# Patient Record
Sex: FEMALE | Race: BLACK OR AFRICAN AMERICAN | NOT HISPANIC OR LATINO | Employment: UNEMPLOYED | URBAN - METROPOLITAN AREA
[De-identification: names, ages, dates, MRNs, and addresses within clinical notes are randomized per-mention and may not be internally consistent; named-entity substitution may affect disease eponyms.]

---

## 2018-05-11 ENCOUNTER — HOSPITAL ENCOUNTER (EMERGENCY)
Facility: OTHER | Age: 28
Discharge: HOME | End: 2018-05-11
Attending: EMERGENCY MEDICINE
Payer: MEDICAID

## 2018-05-11 VITALS
BODY MASS INDEX: 32.66 KG/M2 | OXYGEN SATURATION: 97 % | HEIGHT: 62 IN | HEART RATE: 72 BPM | RESPIRATION RATE: 16 BRPM | DIASTOLIC BLOOD PRESSURE: 74 MMHG | WEIGHT: 177.5 LBS | SYSTOLIC BLOOD PRESSURE: 102 MMHG | TEMPERATURE: 98 F

## 2018-05-11 DIAGNOSIS — R10.33 PERIUMBILICAL PAIN: Primary | ICD-10-CM

## 2018-05-11 DIAGNOSIS — R11.0 NAUSEA IN ADULT: ICD-10-CM

## 2018-05-11 LAB
ALBUMIN SERPL BCP-MCNC: 3.9 G/DL
ALP SERPL-CCNC: 92 U/L
ALT SERPL W/O P-5'-P-CCNC: 37 U/L
ANION GAP SERPL CALC-SCNC: 15 MMOL/L
AST SERPL-CCNC: 35 U/L
B-HCG UR QL: NEGATIVE
BASOPHILS # BLD AUTO: 0.02 K/UL
BASOPHILS NFR BLD: 0.3 %
BILIRUB SERPL-MCNC: 0.3 MG/DL
BILIRUB UR QL STRIP: NEGATIVE
BUN SERPL-MCNC: 8 MG/DL
CALCIUM SERPL-MCNC: 9.1 MG/DL
CHLORIDE SERPL-SCNC: 105 MMOL/L
CLARITY UR: ABNORMAL
CO2 SERPL-SCNC: 22 MMOL/L
COLOR UR: YELLOW
CREAT SERPL-MCNC: 0.7 MG/DL
CTP QC/QA: YES
DIFFERENTIAL METHOD: NORMAL
EOSINOPHIL # BLD AUTO: 0 K/UL
EOSINOPHIL NFR BLD: 0.7 %
ERYTHROCYTE [DISTWIDTH] IN BLOOD BY AUTOMATED COUNT: 14 %
EST. GFR  (AFRICAN AMERICAN): >60 ML/MIN/1.73 M^2
EST. GFR  (NON AFRICAN AMERICAN): >60 ML/MIN/1.73 M^2
GLUCOSE SERPL-MCNC: 82 MG/DL
GLUCOSE UR QL STRIP: NEGATIVE
HCT VFR BLD AUTO: 37.5 %
HGB BLD-MCNC: 12 G/DL
HGB UR QL STRIP: NEGATIVE
KETONES UR QL STRIP: NEGATIVE
LEUKOCYTE ESTERASE UR QL STRIP: NEGATIVE
LIPASE SERPL-CCNC: 21 U/L
LYMPHOCYTES # BLD AUTO: 2 K/UL
LYMPHOCYTES NFR BLD: 33.9 %
MCH RBC QN AUTO: 28 PG
MCHC RBC AUTO-ENTMCNC: 32 G/DL
MCV RBC AUTO: 87 FL
MONOCYTES # BLD AUTO: 0.3 K/UL
MONOCYTES NFR BLD: 5.3 %
NEUTROPHILS # BLD AUTO: 3.5 K/UL
NEUTROPHILS NFR BLD: 59.6 %
NITRITE UR QL STRIP: NEGATIVE
PH UR STRIP: 6 [PH] (ref 5–8)
PLATELET # BLD AUTO: 238 K/UL
PMV BLD AUTO: 11.4 FL
POTASSIUM SERPL-SCNC: 3.4 MMOL/L
PROT SERPL-MCNC: 8.3 G/DL
PROT UR QL STRIP: NEGATIVE
RBC # BLD AUTO: 4.29 M/UL
SODIUM SERPL-SCNC: 142 MMOL/L
SP GR UR STRIP: >=1.03 (ref 1–1.03)
URN SPEC COLLECT METH UR: ABNORMAL
UROBILINOGEN UR STRIP-ACNC: NEGATIVE EU/DL
WBC # BLD AUTO: 5.87 K/UL

## 2018-05-11 PROCEDURE — 85025 COMPLETE CBC W/AUTO DIFF WBC: CPT

## 2018-05-11 PROCEDURE — 80053 COMPREHEN METABOLIC PANEL: CPT

## 2018-05-11 PROCEDURE — 81025 URINE PREGNANCY TEST: CPT | Performed by: EMERGENCY MEDICINE

## 2018-05-11 PROCEDURE — 83690 ASSAY OF LIPASE: CPT

## 2018-05-11 PROCEDURE — 96372 THER/PROPH/DIAG INJ SC/IM: CPT

## 2018-05-11 PROCEDURE — 25000003 PHARM REV CODE 250: Performed by: EMERGENCY MEDICINE

## 2018-05-11 PROCEDURE — 81003 URINALYSIS AUTO W/O SCOPE: CPT

## 2018-05-11 PROCEDURE — 63600175 PHARM REV CODE 636 W HCPCS: Performed by: EMERGENCY MEDICINE

## 2018-05-11 PROCEDURE — 99283 EMERGENCY DEPT VISIT LOW MDM: CPT

## 2018-05-11 RX ORDER — ONDANSETRON 8 MG/1
8 TABLET, ORALLY DISINTEGRATING ORAL EVERY 6 HOURS PRN
Qty: 10 TABLET | Refills: 0 | Status: SHIPPED | OUTPATIENT
Start: 2018-05-11 | End: 2018-09-30

## 2018-05-11 RX ORDER — ONDANSETRON 8 MG/1
8 TABLET, ORALLY DISINTEGRATING ORAL EVERY 6 HOURS PRN
Qty: 10 TABLET | Refills: 0 | Status: SHIPPED | OUTPATIENT
Start: 2018-05-11 | End: 2018-05-11

## 2018-05-11 RX ORDER — ONDANSETRON 8 MG/1
8 TABLET, ORALLY DISINTEGRATING ORAL
Status: COMPLETED | OUTPATIENT
Start: 2018-05-11 | End: 2018-05-11

## 2018-05-11 RX ORDER — KETOROLAC TROMETHAMINE 30 MG/ML
30 INJECTION, SOLUTION INTRAMUSCULAR; INTRAVENOUS
Status: COMPLETED | OUTPATIENT
Start: 2018-05-11 | End: 2018-05-11

## 2018-05-11 RX ADMIN — KETOROLAC TROMETHAMINE 30 MG: 30 INJECTION, SOLUTION INTRAMUSCULAR at 04:05

## 2018-05-11 RX ADMIN — ONDANSETRON 8 MG: 8 TABLET, ORALLY DISINTEGRATING ORAL at 04:05

## 2018-05-11 NOTE — ED NOTES
Patient sleeping in recliner, respirations even and unlabored, pt in no acute distress. Will continue to monitor.

## 2018-05-11 NOTE — ED TRIAGE NOTES
Patient presents to ED with c/o generalized abdominal pain and nausea x 2 days, also c/o fatigue and generalized weakness. Patient reports that she has a Hx of Crohn's. Patient states that she was kicked out of the [employer] this morning. Patient denies urinary symptoms. Patient ambulatory with steady gait. Pt AAO x4.

## 2018-05-11 NOTE — ED PROVIDER NOTES
Encounter Date: 5/11/2018    SCRIBE #1 NOTE: I, Marjan Chi, am scribing for, and in the presence of, Dr. Fermin.       History     Chief Complaint   Patient presents with    Abdominal Pain     mid abdominal pain x 2 days, Hx of Crohns, c/o nausea and fatigue      Time seen by provider: 4:00 AM    This is a 28 y.o. female who presents with complaint of abdominal pain that has intermittently occurred for two days. The pain is mainly located near the umbilicus. She notes it triggered by certain foods, lasts for approximately five minutes, and is consistent with a prior episode secondary to Chron's disease. Onset of nausea and constipation accompanied pain. The patient has only passed a small amount of stool with straining. She denies fever, chills, vomiting, abdominal distention, back pain, myalgias, or any urinary symptoms.         The history is provided by the patient.     Review of patient's allergies indicates:  No Known Allergies  Past Medical History:   Diagnosis Date    Crohn's disease      Past Surgical History:   Procedure Laterality Date    arm surgery      NO PAST SURGERIES       Family History   Problem Relation Age of Onset    COPD Neg Hx      Social History   Substance Use Topics    Smoking status: Never Smoker    Smokeless tobacco: Never Used    Alcohol use No     Review of Systems   Constitutional: Negative for activity change, appetite change, chills, diaphoresis and fever.   HENT: Negative for congestion, sore throat and trouble swallowing.    Eyes: Negative for photophobia and visual disturbance.   Respiratory: Negative for cough, chest tightness and shortness of breath.    Cardiovascular: Negative for chest pain.   Gastrointestinal: Positive for abdominal pain, constipation and nausea. Negative for abdominal distention and vomiting.   Endocrine: Negative for polydipsia and polyuria.   Genitourinary: Negative for decreased urine volume, difficulty urinating, dysuria, flank pain, frequency,  hematuria and urgency.   Musculoskeletal: Negative for back pain, myalgias and neck pain.   Skin: Negative for rash.   Neurological: Negative for weakness and headaches.   Psychiatric/Behavioral: Negative for confusion.       Physical Exam     Initial Vitals [05/11/18 0353]   BP Pulse Resp Temp SpO2   99/64 85 16 97.7 °F (36.5 °C) 96 %      MAP       75.67         Physical Exam    Nursing note and vitals reviewed.  Constitutional: She appears well-developed and well-nourished. She is not diaphoretic. No distress.   HENT:   Head: Normocephalic and atraumatic.   Eyes: EOM are normal. Pupils are equal, round, and reactive to light.   Neck: Normal range of motion.   Cardiovascular: Normal rate, regular rhythm and normal heart sounds. Exam reveals no gallop and no friction rub.    No murmur heard.  Pulmonary/Chest: Breath sounds normal. No respiratory distress. She has no wheezes. She has no rhonchi. She has no rales.   Abdominal: Soft. There is no tenderness. There is no rebound and no guarding.   Musculoskeletal: Normal range of motion. She exhibits no edema or tenderness.   Neurological: She is alert and oriented to person, place, and time.   Skin: Skin is warm and dry. No rash and no abscess noted. No erythema. No pallor.   Psychiatric: She has a normal mood and affect. Her behavior is normal. Judgment and thought content normal.         ED Course   Procedures  Labs Reviewed   URINALYSIS - Abnormal; Notable for the following:        Result Value    Appearance, UA Hazy (*)     Specific Gravity, UA >=1.030 (*)     All other components within normal limits   CBC W/ AUTO DIFFERENTIAL   COMPREHENSIVE METABOLIC PANEL   LIPASE   POCT URINE PREGNANCY             Medical Decision Making:   Clinical Tests:   Lab Tests: Ordered and Reviewed    Additional MDM:   Comments: 28-year-old female with history of Crohn's disease presents complaining of periumbilical abdominal pain as been intermittent for the past 2 days with  associated nausea and reported constipation even that she had a bowel movement yesterday.  Abdominal exam was benign.  Vital signs within normal limits.     Labs were obtained as well as urinalysis given her history although I suspect that this ED visit was likely secondary to her desire to find a place to stay.  Per the triage, the patient reported feeling kicked out of the [facility] yesterday and was asking if we need of a hospital where she could stay.  She received Zofran ODT and subsequently fell asleep.  His labs are within normal limits she will be discharged without further work-up to f/u in clinic as needed.  .          Scribe Attestation:   Scribe #1: I performed the above scribed service and the documentation accurately describes the services I performed. I attest to the accuracy of the note.    Attending Attestation:           Physician Attestation for Scribe:  Physician Attestation Statement for Scribe #1: I, Dr. Fermin, reviewed documentation, as scribed by Marjan Chi in my presence, and it is both accurate and complete.                    Clinical Impression:     1. Periumbilical pain    2. Nausea in adult                                 Bridgett Fermin MD  05/11/18 9076

## 2018-09-21 ENCOUNTER — HOSPITAL ENCOUNTER (EMERGENCY)
Facility: OTHER | Age: 28
Discharge: HOME | End: 2018-09-21
Attending: EMERGENCY MEDICINE
Payer: MEDICAID

## 2018-09-21 VITALS
HEART RATE: 86 BPM | DIASTOLIC BLOOD PRESSURE: 75 MMHG | BODY MASS INDEX: 35.4 KG/M2 | OXYGEN SATURATION: 95 % | HEIGHT: 60 IN | SYSTOLIC BLOOD PRESSURE: 129 MMHG | TEMPERATURE: 98 F | RESPIRATION RATE: 18 BRPM | WEIGHT: 180.31 LBS

## 2018-09-21 DIAGNOSIS — R07.89 CHEST PAIN, ATYPICAL: Primary | ICD-10-CM

## 2018-09-21 DIAGNOSIS — R45.89 DEPRESSED MOOD: ICD-10-CM

## 2018-09-21 DIAGNOSIS — Z00.8 MEDICAL CLEARANCE FOR PSYCHIATRIC ADMISSION: ICD-10-CM

## 2018-09-21 LAB
ALBUMIN SERPL BCP-MCNC: 3.5 G/DL
ALP SERPL-CCNC: 82 U/L
ALT SERPL W/O P-5'-P-CCNC: 16 U/L
AMPHET+METHAMPHET UR QL: NEGATIVE
ANION GAP SERPL CALC-SCNC: 11 MMOL/L
AST SERPL-CCNC: 15 U/L
B-HCG UR QL: NEGATIVE
BARBITURATES UR QL SCN>200 NG/ML: NEGATIVE
BASOPHILS # BLD AUTO: 0.02 K/UL
BASOPHILS NFR BLD: 0.3 %
BENZODIAZ UR QL SCN>200 NG/ML: NEGATIVE
BILIRUB SERPL-MCNC: 0.2 MG/DL
BILIRUB UR QL STRIP: NEGATIVE
BUN SERPL-MCNC: 9 MG/DL
BZE UR QL SCN: NEGATIVE
CALCIUM SERPL-MCNC: 9.5 MG/DL
CANNABINOIDS UR QL SCN: NEGATIVE
CHLORIDE SERPL-SCNC: 107 MMOL/L
CLARITY UR: CLEAR
CO2 SERPL-SCNC: 22 MMOL/L
COLOR UR: YELLOW
CREAT SERPL-MCNC: 0.7 MG/DL
CREAT UR-MCNC: 101 MG/DL
CTP QC/QA: YES
DIFFERENTIAL METHOD: ABNORMAL
EOSINOPHIL # BLD AUTO: 0.1 K/UL
EOSINOPHIL NFR BLD: 1.2 %
ERYTHROCYTE [DISTWIDTH] IN BLOOD BY AUTOMATED COUNT: 14.3 %
EST. GFR  (AFRICAN AMERICAN): >60 ML/MIN/1.73 M^2
EST. GFR  (NON AFRICAN AMERICAN): >60 ML/MIN/1.73 M^2
ETHANOL SERPL-MCNC: 19 MG/DL
GLUCOSE SERPL-MCNC: 101 MG/DL
GLUCOSE UR QL STRIP: NEGATIVE
HCT VFR BLD AUTO: 36.1 %
HGB BLD-MCNC: 11.6 G/DL
HGB UR QL STRIP: NEGATIVE
KETONES UR QL STRIP: NEGATIVE
LEUKOCYTE ESTERASE UR QL STRIP: NEGATIVE
LYMPHOCYTES # BLD AUTO: 2 K/UL
LYMPHOCYTES NFR BLD: 25.1 %
MCH RBC QN AUTO: 26.8 PG
MCHC RBC AUTO-ENTMCNC: 32.1 G/DL
MCV RBC AUTO: 83 FL
METHADONE UR QL SCN>300 NG/ML: NEGATIVE
MONOCYTES # BLD AUTO: 0.5 K/UL
MONOCYTES NFR BLD: 6.5 %
NEUTROPHILS # BLD AUTO: 5.2 K/UL
NEUTROPHILS NFR BLD: 66.8 %
NITRITE UR QL STRIP: NEGATIVE
OPIATES UR QL SCN: NEGATIVE
PCP UR QL SCN>25 NG/ML: NEGATIVE
PH UR STRIP: 7 [PH] (ref 5–8)
PLATELET # BLD AUTO: 264 K/UL
PMV BLD AUTO: 11.3 FL
POTASSIUM SERPL-SCNC: 3.9 MMOL/L
PROT SERPL-MCNC: 7.8 G/DL
PROT UR QL STRIP: NEGATIVE
RBC # BLD AUTO: 4.33 M/UL
SODIUM SERPL-SCNC: 140 MMOL/L
SP GR UR STRIP: 1.02 (ref 1–1.03)
TOXICOLOGY INFORMATION: NORMAL
TROPONIN I SERPL DL<=0.01 NG/ML-MCNC: <0.006 NG/ML
TSH SERPL DL<=0.005 MIU/L-ACNC: 3.23 UIU/ML
URN SPEC COLLECT METH UR: NORMAL
UROBILINOGEN UR STRIP-ACNC: NEGATIVE EU/DL
WBC # BLD AUTO: 7.8 K/UL

## 2018-09-21 PROCEDURE — 80053 COMPREHEN METABOLIC PANEL: CPT

## 2018-09-21 PROCEDURE — 80307 DRUG TEST PRSMV CHEM ANLYZR: CPT

## 2018-09-21 PROCEDURE — 99284 EMERGENCY DEPT VISIT MOD MDM: CPT | Mod: 25

## 2018-09-21 PROCEDURE — 85025 COMPLETE CBC W/AUTO DIFF WBC: CPT

## 2018-09-21 PROCEDURE — 93010 ELECTROCARDIOGRAM REPORT: CPT | Mod: ,,, | Performed by: INTERNAL MEDICINE

## 2018-09-21 PROCEDURE — 80320 DRUG SCREEN QUANTALCOHOLS: CPT

## 2018-09-21 PROCEDURE — 84443 ASSAY THYROID STIM HORMONE: CPT

## 2018-09-21 PROCEDURE — 84484 ASSAY OF TROPONIN QUANT: CPT

## 2018-09-21 PROCEDURE — 81025 URINE PREGNANCY TEST: CPT | Performed by: EMERGENCY MEDICINE

## 2018-09-21 PROCEDURE — 81003 URINALYSIS AUTO W/O SCOPE: CPT | Mod: 59

## 2018-09-21 PROCEDURE — 93005 ELECTROCARDIOGRAM TRACING: CPT

## 2018-09-21 NOTE — ED PROVIDER NOTES
"Encounter Date: 2018    SCRIBE #1 NOTE: I, Klaudia Johnson, am scribing for, and in the presence of, Dr. Fermin.       History     Chief Complaint   Patient presents with    Suicidal     want to OD because she needs some rest, sister  a few days ago of AIDS and mom  in August, does not have access to pills     Time seen by provider: 12:14 AM    This is a 28 y.o. female who presents with multiple complaints. Patient reports constant mid sternal chest pain that began at 9:00 AM. The "shocking" pain is non radiating. She reports experiencing similar chest pain with Crohn's episodes in the past. She reports intermittent SOB that is not associated with the episodes of chest pain. She also reports abdominal pain that resolved after a bowel movement. She reports PMHx of HTN.    Patient also reports suicidal ideation. She reports feeling depressed for the past month. She reports six or seven past attempts to overdose and last attempt was "a couple" months ago. She denies homicidal ideation. Upon further questioning, she reports she only came here today because she does not have anywhere to sleep and states "I just need to rest." She reports she was staying with a friend, but is now unable to because the friend stays out too late. She does not have access to any pills. She now reports she feels comfortable to leave and that she will not attempt self harm. She has not been seen by psychiatry recently. She is non compliant with Seroquel and Remeron.      The history is provided by the patient.     Review of patient's allergies indicates:  No Known Allergies  Past Medical History:   Diagnosis Date    Crohn's disease     Depression     Hypertension      Past Surgical History:   Procedure Laterality Date    arm surgery      NO PAST SURGERIES       Family History   Problem Relation Age of Onset    COPD Neg Hx      Social History     Tobacco Use    Smoking status: Never Smoker    Smokeless tobacco: Never Used "   Substance Use Topics    Alcohol use: No    Drug use: No     Review of Systems   Constitutional: Negative for fever.   HENT: Negative for congestion, rhinorrhea and sore throat.    Respiratory: Positive for shortness of breath. Negative for cough.    Cardiovascular: Positive for chest pain.   Gastrointestinal: Positive for abdominal pain. Negative for abdominal distention, blood in stool, constipation, diarrhea, nausea and vomiting.   Genitourinary: Negative for dysuria.   Musculoskeletal: Negative for back pain.   Skin: Negative for rash.   Neurological: Negative for weakness and headaches.   Hematological: Does not bruise/bleed easily.   Psychiatric/Behavioral: Positive for dysphoric mood and suicidal ideas. Negative for hallucinations and self-injury.        Negative for homicidal ideation.       Physical Exam     Initial Vitals [09/21/18 0011]   BP Pulse Resp Temp SpO2   (!) 153/93 84 16 98.1 °F (36.7 °C) (!) 94 %      MAP       --         Physical Exam    Nursing note and vitals reviewed.  Constitutional: She appears well-developed and well-nourished. She is not diaphoretic. No distress.   HENT:   Head: Normocephalic and atraumatic.   Eyes: EOM are normal. No scleral icterus.   Neck: Normal range of motion. Neck supple.   Cardiovascular: Normal rate, regular rhythm and normal heart sounds. Exam reveals no gallop and no friction rub.    No murmur heard.  Pulmonary/Chest: Breath sounds normal. No respiratory distress. She has no wheezes. She has no rhonchi. She has no rales.   Abdominal: Soft. Bowel sounds are normal. She exhibits no distension. There is no tenderness. There is no rebound and no guarding.   Musculoskeletal: Normal range of motion. She exhibits no edema or tenderness.   Neurological: She is alert and oriented to person, place, and time.   Skin: Skin is warm and dry. No rash noted. No pallor.         ED Course   Procedures  Labs Reviewed   CBC W/ AUTO DIFFERENTIAL - Abnormal; Notable for the  following components:       Result Value    Hemoglobin 11.6 (*)     Hematocrit 36.1 (*)     MCH 26.8 (*)     All other components within normal limits   COMPREHENSIVE METABOLIC PANEL - Abnormal; Notable for the following components:    CO2 22 (*)     All other components within normal limits   ALCOHOL,MEDICAL (ETHANOL) - Abnormal; Notable for the following components:    Alcohol, Medical, Serum 19 (*)     All other components within normal limits   URINALYSIS, REFLEX TO URINE CULTURE    Narrative:     Preferred Collection Type->Urine, Clean Catch   DRUG SCREEN PANEL, URINE EMERGENCY    Narrative:     Preferred Collection Type->Urine, Clean Catch   TSH   TROPONIN I   POCT URINE PREGNANCY     EKG Readings: (Independently Interpreted)   Sinus rhythm at rate of 83 bpm. Normal intervals. T wave inversion in lead III. No ST changes.       Imaging Results    None             Additional MDM:   Comments: 28-year-old female with no documented psychiatric history in our system presents complaining of suicidal ideation as well as chest pain. She reported her chest pain was nonradiating and has been present throughout the day.  She also reported feeling that she would overdose on medicines but did not have access to any.  Her story was very inconsistent, therefore, I did explain to her the process if she were to be PEC'd.  At that time the patient retracted her statement that she had any suicidal ideation.  She clarified that she had been outd because of this she could not return to the shelter.  She stated she just needed a place to sleep.  EKG, troponin, and medical clearance labs were obtained.  EKG showed no evidence any acute ischemic changes.  Troponin was negative. Clearance labs were unremarkable. The patient has now been in the emergency department for almost 2 hr.  On reassessment I asked her again if she had any suicide ideation and she denies.  Patient reassured me that she felt comfortable being discharged.  At this  time she does not meet criteria for a PEC.  She will be discharged in stable condition..          Scribe Attestation:   Scribe #1: I performed the above scribed service and the documentation accurately describes the services I performed. I attest to the accuracy of the note.    Attending Attestation:           Physician Attestation for Scribe:  Physician Attestation Statement for Scribe #1: I, Dr. Fermin, reviewed documentation, as scribed by Klaudia Johnson in my presence, and it is both accurate and complete.                    Clinical Impression:     1. Medical clearance for psychiatric admission                                 Bridgett Fermin MD  09/21/18 0139

## 2018-09-21 NOTE — ED NOTES
Pt resting comfortably, NAD noted. Pt denies CP or SI at At this time. Side rails upx2, call bell within reach. Will continue to monitor

## 2018-09-21 NOTE — ED NOTES
"Upon d/c pt states that she " will follow up with MD to get back on medication". Pt does not mention SI upon discharge  "

## 2018-09-21 NOTE — ED TRIAGE NOTES
"Pt presents to ED with c/o mid sternal chest "throbbing" chest pain that started around 9am yesterday. Pt denies hx of chest pain, sob, abdominal pain, n/v, dizziness, weakness. Pt also states that she has been "feeling suicidal" since earlier today because she " is tired " pt states her plan was to "overdose" on pills. .  After MD at bedside explained process of PEC, pt's tone changed and stated " she would not kill herself if she went home " pt states " I am just tried and could not get into the [employer] tonight because I stayed out late with a friend". After discussing plan with pt, pt denied multiple times that she is not suicidal and that she just "wants to get some rest". Pt admits that she has a hx of depression and htn but has not been taking her remeron for over a month.  Pt denies HI, hallucination s    "

## 2018-09-30 ENCOUNTER — HOSPITAL ENCOUNTER (EMERGENCY)
Facility: OTHER | Age: 28
Discharge: HOME | End: 2018-09-30
Attending: EMERGENCY MEDICINE
Payer: MEDICAID

## 2018-09-30 VITALS
HEIGHT: 59 IN | TEMPERATURE: 98 F | SYSTOLIC BLOOD PRESSURE: 136 MMHG | BODY MASS INDEX: 30.24 KG/M2 | OXYGEN SATURATION: 99 % | WEIGHT: 150 LBS | HEART RATE: 93 BPM | DIASTOLIC BLOOD PRESSURE: 75 MMHG

## 2018-09-30 DIAGNOSIS — Z87.19 HISTORY OF BLOODY STOOLS: ICD-10-CM

## 2018-09-30 DIAGNOSIS — R10.84 GENERALIZED ABDOMINAL PAIN: Primary | ICD-10-CM

## 2018-09-30 LAB
ALBUMIN SERPL BCP-MCNC: 3.6 G/DL
ALP SERPL-CCNC: 91 U/L
ALT SERPL W/O P-5'-P-CCNC: 20 U/L
ANION GAP SERPL CALC-SCNC: 11 MMOL/L
AST SERPL-CCNC: 19 U/L
B-HCG UR QL: NEGATIVE
BACTERIA #/AREA URNS HPF: ABNORMAL /HPF
BASOPHILS # BLD AUTO: 0.01 K/UL
BASOPHILS NFR BLD: 0.2 %
BILIRUB SERPL-MCNC: 0.2 MG/DL
BILIRUB UR QL STRIP: NEGATIVE
BUN SERPL-MCNC: 6 MG/DL
CALCIUM SERPL-MCNC: 9 MG/DL
CHLORIDE SERPL-SCNC: 106 MMOL/L
CLARITY UR: CLEAR
CO2 SERPL-SCNC: 22 MMOL/L
COLOR UR: YELLOW
CREAT SERPL-MCNC: 0.7 MG/DL
CTP QC/QA: YES
DIFFERENTIAL METHOD: ABNORMAL
EOSINOPHIL # BLD AUTO: 0.1 K/UL
EOSINOPHIL NFR BLD: 1.4 %
ERYTHROCYTE [DISTWIDTH] IN BLOOD BY AUTOMATED COUNT: 15 %
EST. GFR  (AFRICAN AMERICAN): >60 ML/MIN/1.73 M^2
EST. GFR  (NON AFRICAN AMERICAN): >60 ML/MIN/1.73 M^2
GLUCOSE SERPL-MCNC: 107 MG/DL
GLUCOSE UR QL STRIP: NEGATIVE
HCT VFR BLD AUTO: 36.2 %
HGB BLD-MCNC: 11.5 G/DL
HGB UR QL STRIP: ABNORMAL
KETONES UR QL STRIP: NEGATIVE
LEUKOCYTE ESTERASE UR QL STRIP: NEGATIVE
LIPASE SERPL-CCNC: 22 U/L
LYMPHOCYTES # BLD AUTO: 1.8 K/UL
LYMPHOCYTES NFR BLD: 27.9 %
MCH RBC QN AUTO: 26.9 PG
MCHC RBC AUTO-ENTMCNC: 31.8 G/DL
MCV RBC AUTO: 85 FL
MICROSCOPIC COMMENT: ABNORMAL
MONOCYTES # BLD AUTO: 0.6 K/UL
MONOCYTES NFR BLD: 8.8 %
NEUTROPHILS # BLD AUTO: 3.9 K/UL
NEUTROPHILS NFR BLD: 61.5 %
NITRITE UR QL STRIP: NEGATIVE
PH UR STRIP: 6 [PH] (ref 5–8)
PLATELET # BLD AUTO: 262 K/UL
PMV BLD AUTO: 10.9 FL
POTASSIUM SERPL-SCNC: 3.9 MMOL/L
PROT SERPL-MCNC: 7.9 G/DL
PROT UR QL STRIP: NEGATIVE
RBC # BLD AUTO: 4.27 M/UL
RBC #/AREA URNS HPF: 5 /HPF (ref 0–4)
SODIUM SERPL-SCNC: 139 MMOL/L
SP GR UR STRIP: >=1.03 (ref 1–1.03)
SQUAMOUS #/AREA URNS HPF: 4 /HPF
URN SPEC COLLECT METH UR: ABNORMAL
UROBILINOGEN UR STRIP-ACNC: NEGATIVE EU/DL
WBC # BLD AUTO: 6.37 K/UL
WBC #/AREA URNS HPF: 0 /HPF (ref 0–5)
WBC CLUMPS URNS QL MICRO: ABNORMAL
YEAST URNS QL MICRO: ABNORMAL

## 2018-09-30 PROCEDURE — 81000 URINALYSIS NONAUTO W/SCOPE: CPT

## 2018-09-30 PROCEDURE — 85025 COMPLETE CBC W/AUTO DIFF WBC: CPT

## 2018-09-30 PROCEDURE — 81025 URINE PREGNANCY TEST: CPT | Performed by: EMERGENCY MEDICINE

## 2018-09-30 PROCEDURE — 96374 THER/PROPH/DIAG INJ IV PUSH: CPT

## 2018-09-30 PROCEDURE — 83690 ASSAY OF LIPASE: CPT

## 2018-09-30 PROCEDURE — 25000003 PHARM REV CODE 250: Performed by: PHYSICIAN ASSISTANT

## 2018-09-30 PROCEDURE — 80053 COMPREHEN METABOLIC PANEL: CPT

## 2018-09-30 PROCEDURE — 99283 EMERGENCY DEPT VISIT LOW MDM: CPT | Mod: 25

## 2018-09-30 PROCEDURE — 63600175 PHARM REV CODE 636 W HCPCS: Performed by: PHYSICIAN ASSISTANT

## 2018-09-30 PROCEDURE — 96361 HYDRATE IV INFUSION ADD-ON: CPT

## 2018-09-30 RX ORDER — HYOSCYAMINE SULFATE 0.5 MG/ML
0.5 INJECTION, SOLUTION SUBCUTANEOUS
Status: COMPLETED | OUTPATIENT
Start: 2018-09-30 | End: 2018-09-30

## 2018-09-30 RX ADMIN — HYOSCYAMINE SULFATE 0.5 MG: 0.5 INJECTION, SOLUTION SUBCUTANEOUS at 09:09

## 2018-09-30 RX ADMIN — SODIUM CHLORIDE 1000 ML: 0.9 INJECTION, SOLUTION INTRAVENOUS at 09:09

## 2018-10-01 NOTE — ED PROVIDER NOTES
Encounter Date: 9/30/2018       History     Chief Complaint   Patient presents with    Rectal Bleeding     onset 2 days ago, abdominal pain, hx chrons, been off chrons medication     Patient is a 28 y.o. female with a past medical history of hypertension, depression, Crohn's disease, presenting with complaints of rectal bleeding.  Patient states that her lower abdominal pain started 2 days ago and has been constant since.  She reports she has had multiple episodes of rectal bleeding with her loose stools.  She admits that she has a longstanding history of Crohn's disease but is not on medication for at least 2 years.  She states she thinks she needs to be on some now but does not have a GI provider here.  She denies any fever or chills. She states she had have a blood transfusion which she is very on but has not had 1 for many years.This is the extent of the patient's complaints at this time.         The history is provided by the patient.     Review of patient's allergies indicates:  No Known Allergies  Past Medical History:   Diagnosis Date    Crohn's disease     Depression     Hypertension      Past Surgical History:   Procedure Laterality Date    arm surgery      NO PAST SURGERIES       Family History   Problem Relation Age of Onset    COPD Neg Hx      Social History     Tobacco Use    Smoking status: Current Every Day Smoker    Smokeless tobacco: Never Used   Substance Use Topics    Alcohol use: No    Drug use: No     Review of Systems   Constitutional: Negative for activity change, appetite change, chills, fatigue and fever.   HENT: Negative for congestion, rhinorrhea and sore throat.    Eyes: Negative for photophobia and visual disturbance.   Respiratory: Negative for cough, shortness of breath and wheezing.    Cardiovascular: Negative for chest pain.   Gastrointestinal: Positive for abdominal pain and blood in stool. Negative for diarrhea, nausea and vomiting.   Genitourinary: Negative for  dysuria, hematuria and urgency.   Musculoskeletal: Negative for back pain, myalgias and neck pain.   Skin: Negative for color change and wound.   Neurological: Negative for weakness and headaches.   Psychiatric/Behavioral: Negative for agitation and confusion.       Physical Exam     Initial Vitals [09/30/18 2126]   BP Pulse Resp Temp SpO2   (!) 164/108 (!) 112 -- 97.7 °F (36.5 °C) 98 %      MAP       --         Physical Exam    Nursing note and vitals reviewed.  Constitutional: She appears well-developed and well-nourished. She is not diaphoretic.  Non-toxic appearance. She does not have a sickly appearance. No distress.   Well-appearing, obese,  female accompanied the emergency department.  Speaking clearly in full sentences.  No acute distress.   HENT:   Head: Normocephalic and atraumatic.   Right Ear: External ear normal.   Left Ear: External ear normal.   Nose: Nose normal.   Mouth/Throat: Oropharynx is clear and moist.   Eyes: Conjunctivae and EOM are normal.   Neck: Normal range of motion. Neck supple.   Cardiovascular: Regular rhythm and normal heart sounds. Tachycardia present.    Pulmonary/Chest: Breath sounds normal. No respiratory distress. She has no wheezes.   Abdominal: Soft. Bowel sounds are normal. She exhibits no distension. There is no tenderness. There is no rebound and no guarding.   Genitourinary: Rectal exam shows external hemorrhoid. Rectal exam shows no internal hemorrhoid, anal tone normal and guaiac negative stool. Guaiac negative stool.   Musculoskeletal: Normal range of motion.   Neurological: She is alert and oriented to person, place, and time.   Skin: Skin is warm.   Psychiatric: She has a normal mood and affect. Her behavior is normal. Judgment and thought content normal.         ED Course   Procedures  Labs Reviewed   CBC W/ AUTO DIFFERENTIAL - Abnormal; Notable for the following components:       Result Value    Hemoglobin 11.5 (*)     Hematocrit 36.2 (*)     MCH  26.9 (*)     MCHC 31.8 (*)     RDW 15.0 (*)     All other components within normal limits   COMPREHENSIVE METABOLIC PANEL - Abnormal; Notable for the following components:    CO2 22 (*)     All other components within normal limits   URINALYSIS, REFLEX TO URINE CULTURE - Abnormal; Notable for the following components:    Specific Gravity, UA >=1.030 (*)     Occult Blood UA 2+ (*)     All other components within normal limits    Narrative:     Preferred Collection Type->Urine, Clean Catch   URINALYSIS MICROSCOPIC - Abnormal; Notable for the following components:    RBC, UA 5 (*)     All other components within normal limits    Narrative:     Preferred Collection Type->Urine, Clean Catch   LIPASE   POCT URINE PREGNANCY           Medical Decision Making:   Initial Assessment:   Urgent evaluation a 28-year-old female with a past medical history of hypertension, depression, Crohn's, presenting to the emergency department with complaints of abdominal pain and rectal bleeding x2 days.  Patient is afebrile, nontoxic appearing, hemodynamically stable.  Physical exam reveals mild tenderness to palpation of the suprapubic abdomen with no rebound, guarding, mass. Tachycardia noted. Will plan for labs, fluids and reassess.  Clinical Tests:   Lab Tests: Ordered and Reviewed  The following lab test(s) were unremarkable: CBC, CMP, Urinalysis and UPT  ED Management:  UPT is negative.  UA is unremarkable.  CBC shows no leukocytosis, H&H 11.5/36.2, consistent with baseline.  CMP is unremarkable. Lipase is normal. On reassessment, patient reports improvement of her symptoms.  She denies any further pain.  Guaiac-negative stool.  Patient opted on medication in over 2 years, do not feel this appropriate to start medication for Crohn's disease in the emergency department tonight.  I explained to the patient that she needed to obtain follow up with PCP that could refer her to GI.  She is given resources for all of this.  Repeat vitals  showed normalization of heart rate at 93 beats per minute. At this time, no further testing imaging is warranted.  Patient is discharged home in stable condition.The patient was instructed to follow up with a primary care provider in 2 days or to return to the emergency department for worsening symptoms. The treatment plan was discussed with the patient who demonstrated understanding and comfort with plan. The patient's history, physical exam, and plan of care was discussed with and agreed upon with my supervising physician.     This note was created using [employer] Fluency Direct. There may be typographical errors secondary to dictation.                         Clinical Impression:     1. Generalized abdominal pain    2. History of bloody stools           Disposition:   Disposition: Discharged  Condition: Stable                        Juliet Huertas PA-C  09/30/18 2328       Juliet Huertas PA-C  09/30/18 2320

## 2018-10-01 NOTE — ED NOTES
Pt with Hx of crohn's disease and not taking medications for at least 2 years states that she has had generalized abd pain and rectal bleeding with diarrhea X 2 days. Pt states that she has to get a PCP and a GI dr so that she can get back on her medications. Pt has no idea what those are other than prednisone but does not know dosage

## 2018-10-01 NOTE — ED NOTES
EDUCATION: Discussed with pt need to control her HTN; states she used to be on medications; also discussed need for her to establish a PCM to treat her for her HTN and her Crohns disease; Discussed with pt risk of stroke if HTN not managed; states her mother  at age 56 from a stroke;

## 2018-10-01 NOTE — ED TRIAGE NOTES
"Pt states history of Crohns disease, states "I think I'm eating too much hot suace" ; "I've had rectal bleeding and loose stools for two days"  "

## 2018-10-02 ENCOUNTER — HOSPITAL ENCOUNTER (EMERGENCY)
Facility: OTHER | Age: 28
Discharge: PSYCHIATRIC OR SUBSTANCE USE FACILITY | End: 2018-10-03
Attending: EMERGENCY MEDICINE
Payer: MEDICAID

## 2018-10-02 DIAGNOSIS — R45.851 SUICIDAL IDEATIONS: Primary | ICD-10-CM

## 2018-10-02 PROCEDURE — 80320 DRUG SCREEN QUANTALCOHOLS: CPT

## 2018-10-02 PROCEDURE — 81000 URINALYSIS NONAUTO W/SCOPE: CPT | Mod: 59

## 2018-10-02 PROCEDURE — 85025 COMPLETE CBC W/AUTO DIFF WBC: CPT

## 2018-10-02 PROCEDURE — 80329 ANALGESICS NON-OPIOID 1 OR 2: CPT

## 2018-10-02 PROCEDURE — 81025 URINE PREGNANCY TEST: CPT | Performed by: EMERGENCY MEDICINE

## 2018-10-02 PROCEDURE — 99285 EMERGENCY DEPT VISIT HI MDM: CPT

## 2018-10-02 PROCEDURE — 80307 DRUG TEST PRSMV CHEM ANLYZR: CPT

## 2018-10-02 PROCEDURE — 80048 BASIC METABOLIC PNL TOTAL CA: CPT

## 2018-10-03 VITALS
HEART RATE: 88 BPM | OXYGEN SATURATION: 96 % | BODY MASS INDEX: 35.44 KG/M2 | WEIGHT: 200 LBS | TEMPERATURE: 98 F | RESPIRATION RATE: 18 BRPM | SYSTOLIC BLOOD PRESSURE: 143 MMHG | HEIGHT: 63 IN | DIASTOLIC BLOOD PRESSURE: 87 MMHG

## 2018-10-03 LAB
AMPHET+METHAMPHET UR QL: NEGATIVE
ANION GAP SERPL CALC-SCNC: 14 MMOL/L
APAP SERPL-MCNC: <3 UG/ML
B-HCG UR QL: NEGATIVE
BACTERIA #/AREA URNS HPF: ABNORMAL /HPF
BARBITURATES UR QL SCN>200 NG/ML: NEGATIVE
BASOPHILS # BLD AUTO: 0.01 K/UL
BASOPHILS NFR BLD: 0.2 %
BENZODIAZ UR QL SCN>200 NG/ML: NEGATIVE
BILIRUB UR QL STRIP: NEGATIVE
BUN SERPL-MCNC: 14 MG/DL
BZE UR QL SCN: NORMAL
CALCIUM SERPL-MCNC: 9.8 MG/DL
CANNABINOIDS UR QL SCN: NEGATIVE
CHLORIDE SERPL-SCNC: 107 MMOL/L
CLARITY UR: CLEAR
CO2 SERPL-SCNC: 22 MMOL/L
COLOR UR: YELLOW
CREAT SERPL-MCNC: 0.9 MG/DL
CREAT UR-MCNC: 204.9 MG/DL
CTP QC/QA: YES
DIFFERENTIAL METHOD: ABNORMAL
EOSINOPHIL # BLD AUTO: 0.1 K/UL
EOSINOPHIL NFR BLD: 0.8 %
ERYTHROCYTE [DISTWIDTH] IN BLOOD BY AUTOMATED COUNT: 15.4 %
EST. GFR  (AFRICAN AMERICAN): >60 ML/MIN/1.73 M^2
EST. GFR  (NON AFRICAN AMERICAN): >60 ML/MIN/1.73 M^2
ETHANOL SERPL-MCNC: 106 MG/DL
ETHANOL SERPL-MCNC: 181 MG/DL
ETHANOL SERPL-MCNC: 79 MG/DL
GLUCOSE SERPL-MCNC: 110 MG/DL
GLUCOSE UR QL STRIP: NEGATIVE
HCT VFR BLD AUTO: 37.9 %
HGB BLD-MCNC: 12.1 G/DL
HGB UR QL STRIP: ABNORMAL
KETONES UR QL STRIP: ABNORMAL
LEUKOCYTE ESTERASE UR QL STRIP: NEGATIVE
LYMPHOCYTES # BLD AUTO: 2.1 K/UL
LYMPHOCYTES NFR BLD: 32.2 %
MCH RBC QN AUTO: 27.1 PG
MCHC RBC AUTO-ENTMCNC: 31.9 G/DL
MCV RBC AUTO: 85 FL
METHADONE UR QL SCN>300 NG/ML: NEGATIVE
MICROSCOPIC COMMENT: ABNORMAL
MONOCYTES # BLD AUTO: 0.4 K/UL
MONOCYTES NFR BLD: 6.3 %
NEUTROPHILS # BLD AUTO: 3.9 K/UL
NEUTROPHILS NFR BLD: 60.3 %
NITRITE UR QL STRIP: NEGATIVE
OPIATES UR QL SCN: NORMAL
PCP UR QL SCN>25 NG/ML: NEGATIVE
PH UR STRIP: 6 [PH] (ref 5–8)
PLATELET # BLD AUTO: 306 K/UL
PMV BLD AUTO: 11 FL
POTASSIUM SERPL-SCNC: 3.6 MMOL/L
PROT UR QL STRIP: ABNORMAL
RBC # BLD AUTO: 4.46 M/UL
RBC #/AREA URNS HPF: 10 /HPF (ref 0–4)
SALICYLATES SERPL-MCNC: <5 MG/DL
SODIUM SERPL-SCNC: 143 MMOL/L
SP GR UR STRIP: >=1.03 (ref 1–1.03)
SQUAMOUS #/AREA URNS HPF: 4 /HPF
TOXICOLOGY INFORMATION: NORMAL
URN SPEC COLLECT METH UR: ABNORMAL
UROBILINOGEN UR STRIP-ACNC: NEGATIVE EU/DL
WBC # BLD AUTO: 6.37 K/UL
WBC #/AREA URNS HPF: 0 /HPF (ref 0–5)
WBC CLUMPS URNS QL MICRO: ABNORMAL
YEAST URNS QL MICRO: ABNORMAL

## 2018-10-03 PROCEDURE — 80320 DRUG SCREEN QUANTALCOHOLS: CPT | Mod: 91

## 2018-10-03 RX ORDER — DIPHENHYDRAMINE HYDROCHLORIDE 50 MG/ML
50 INJECTION INTRAMUSCULAR; INTRAVENOUS EVERY 4 HOURS PRN
Status: DISCONTINUED | OUTPATIENT
Start: 2018-10-03 | End: 2018-10-03 | Stop reason: HOSPADM

## 2018-10-03 RX ORDER — LORAZEPAM 1 MG/1
2 TABLET ORAL EVERY 4 HOURS PRN
Status: DISCONTINUED | OUTPATIENT
Start: 2018-10-03 | End: 2018-10-03 | Stop reason: HOSPADM

## 2018-10-03 RX ORDER — HALOPERIDOL 5 MG/ML
5 INJECTION INTRAMUSCULAR EVERY 4 HOURS PRN
Status: DISCONTINUED | OUTPATIENT
Start: 2018-10-03 | End: 2018-10-03 | Stop reason: HOSPADM

## 2018-10-03 NOTE — ED NOTES
Admit packet faxed to the following facilities: [facility], [facility], [facility], and [facility].

## 2018-10-03 NOTE — ED PROVIDER NOTES
"Encounter Date: 10/2/2018    SCRIBE #1 NOTE: I, Shivani Guajardo, am scribing for, and in the presence of, Dr. Grider.       History     Chief Complaint   Patient presents with    detox     "Im dopesick, and need to go somewhere, ill kick everything in the hospital if yall dont."     Time seen by provider: 11:19 PM    This is a 28 y.o. female with hx of HTN, alcohol abuse, cocaine abuse, and depression who presents due to SI today. She has wanted to kill herself for the past 2 days. She reports that she would stab herself  "because I didn't have any money to go to the store and buy any pills." She has hx of multiple overdoses in the past with suicide attempts. Pt states "I'm tired" and "I'm miserable." She reports that her sister  3 weeks ago due to AIDS and pt states "I didn't go to see my sister and it's fucking with me." She also reports death of mother years ago and recent death of nephew a month ago. She reports flashbacks and fatigue. She has been admitted to a psychiatric facility in the past. She reports that she has not followed up with any psychiatrists. She denies any recent drinking. Pt reports no HI or AH/VH. She reports no fever, chills, chest pain, SOB, dizziness, and HA.      The history is provided by the patient.     Review of patient's allergies indicates:  No Known Allergies  Past Medical History:   Diagnosis Date    Crohn's disease     Depression     Hypertension      Past Surgical History:   Procedure Laterality Date    arm surgery      NO PAST SURGERIES       Family History   Problem Relation Age of Onset    COPD Neg Hx      Social History     Tobacco Use    Smoking status: Current Every Day Smoker    Smokeless tobacco: Never Used   Substance Use Topics    Alcohol use: No    Drug use: No     Review of Systems   Constitutional: Positive for fatigue. Negative for chills and fever.   HENT: Negative for congestion, rhinorrhea and sore throat.    Respiratory: Negative for cough " and shortness of breath.    Cardiovascular: Negative for chest pain.   Gastrointestinal: Negative for abdominal pain, diarrhea, nausea and vomiting.   Endocrine: Negative for polyuria.   Genitourinary: Negative for decreased urine volume and dysuria.   Musculoskeletal: Negative for back pain.   Skin: Negative for rash.   Allergic/Immunologic: Negative for immunocompromised state.   Neurological: Negative for dizziness, weakness, light-headedness and headaches.   Hematological: Does not bruise/bleed easily.   Psychiatric/Behavioral: Positive for suicidal ideas. Negative for confusion and hallucinations.        Negative for HI.       Physical Exam     Initial Vitals [10/02/18 2306]   BP Pulse Resp Temp SpO2   126/60 102 16 97.7 °F (36.5 °C) 97 %      MAP       --         Physical Exam    Nursing note and vitals reviewed.  Constitutional: She appears well-developed and well-nourished. No distress.   Appears intoxicated.    HENT:   Head: Normocephalic and atraumatic.   Right Ear: External ear normal.   Left Ear: External ear normal.   Eyes: Right eye exhibits no discharge. Left eye exhibits no discharge.   Neck: Normal range of motion. Neck supple.   Cardiovascular: Normal rate, regular rhythm and normal heart sounds.   Pulmonary/Chest: Breath sounds normal. No respiratory distress.   Abdominal: Soft. Bowel sounds are normal. She exhibits no distension. There is no tenderness.   Musculoskeletal: Normal range of motion.   Lymphadenopathy:     She has no cervical adenopathy.   Neurological: She is alert and oriented to person, place, and time. She has normal strength. No cranial nerve deficit or sensory deficit.   Skin: Skin is warm and dry. No rash noted. No erythema.   Psychiatric: Her behavior is normal.   Pt is suicidal. States she is going to stab herself. Normal mood. Expanded affect.         ED Course   Procedures  Labs Reviewed   CBC W/ AUTO DIFFERENTIAL - Abnormal; Notable for the following components:        Result Value    MCHC 31.9 (*)     RDW 15.4 (*)     All other components within normal limits   BASIC METABOLIC PANEL - Abnormal; Notable for the following components:    CO2 22 (*)     All other components within normal limits   URINALYSIS - Abnormal; Notable for the following components:    Specific Gravity, UA >=1.030 (*)     Protein, UA Trace (*)     Ketones, UA Trace (*)     Occult Blood UA 3+ (*)     All other components within normal limits   ACETAMINOPHEN LEVEL - Abnormal; Notable for the following components:    Acetaminophen (Tylenol), Serum <3.0 (*)     All other components within normal limits   SALICYLATE LEVEL - Abnormal; Notable for the following components:    Salicylate Lvl <5.0 (*)     All other components within normal limits   ALCOHOL,MEDICAL (ETHANOL) - Abnormal; Notable for the following components:    Alcohol, Medical, Serum 181 (*)     All other components within normal limits   URINALYSIS MICROSCOPIC - Abnormal; Notable for the following components:    RBC, UA 10 (*)     All other components within normal limits   DRUG SCREEN PANEL, URINE EMERGENCY   ALCOHOL,MEDICAL (ETHANOL)   POCT URINE PREGNANCY             Medical Decision Making:   History:   Old Records Summarized: other records.       <> Summary of Records:   Pt was admitted to [facility] in 2017 for OD and attempted suicide. She has had 4 overdoses in the past. Intentional tylenol overdose in February. Hx of cocaine intoxication with perceptual disturbance. Hx of alcohol and cocaine abuse.  Clinical Tests:   Lab Tests: Ordered and Reviewed  ED Management:  28-year-old female presents with suicidal ideations.  Review medical records she has a history of schizophrenia as well as alcohol and cocaine abuse.  Reviewing the medical records does able to access [facility] records.  Last time the patient had presented with suicidal ideations was in February of this year which she was PEC'd and kept inpatient for 2-3 days.  His I  was unable to find anything the chart suggesting a malingering like behavior.  Will get the labs but not immediately placed on a PEC.  I suspect she is intoxicated and will re-evaluate when she is less intoxicated.    2:30 a.m. on re-evaluation patient still states she wants to kill herself and will use some the else's knife to stab herself.  Based on this as well as the consistency of her story throughout the evening, I will place the patient on a PEC for further psychiatric evaluation.    This is the extent to the patients complaints today here in the emergency department.            Scribe Attestation:   Scribe #1: I performed the above scribed service and the documentation accurately describes the services I performed. I attest to the accuracy of the note.    Attending Attestation:           Physician Attestation for Scribe:  Physician Attestation Statement for Scribe #1: I, Dr. Grider, reviewed documentation, as scribed by Shivani Guajardo in my presence, and it is both accurate and complete.                    Clinical Impression:     1. Suicidal ideations                                 Evaristo Grider, DO  10/03/18 0414

## 2018-10-03 NOTE — ED NOTES
Pt resting quietly. walked to bathroom with escort. breakfast order. Pt informed of transfer and breakfast ordered. Kiki roque at bedside.

## 2018-10-03 NOTE — ED NOTES
Patient accepted at [facility] by Dr. Mary Stevens. Report to be called to [phone].

## 2018-10-03 NOTE — ED NOTES
Faxed packet to [facility], [facility], [facility], [facility], [facility], [facility], [facility], [facility], [facility], [facility], [facility], [facility],. [facility], and [facility],  [facility].

## 2018-10-03 NOTE — ED NOTES
"Pt is AAOx4 with ABC's intact, C/C of detox x 3 days, pt stated " I got the med sickness because I ain't been taking my stuff because my mom , I need help my brain ain't right I want to kill myself ". Pt denies CP,SOB, N/V/D, or any pain. equal CBBS, ABD soft/ non tender, PMSx4 to all extremities, negative JVD/ edema. patient is aggressive and loud and has poor train of thought, denies HI, no social hx/ family hx.  General: No fever, chills, fatigability, or weight loss.  Skin: No rashes, itching, or changes in color or texture of skin.  Chest: Denies PARK, cyanosis, wheezing, cough, and sputum production.  Abdomen: Appetite fine. No weight loss. abdominal pain, hematemesis, or blood in stool.  Musculoskeletal: No joint stiffness or swelling. Denies back pain.  All other review of systems negative.  Pt punched rails on bed. Pt continually cursed staff during assessment.  "

## 2018-10-03 NOTE — ED NOTES
Pt care assumed   Pt resting in bed comfortably at this time with respirations even and unlabored, bathroom needs assessed and pain level remains 0/10. PEC in place and one to one direct observation maintained with Aliza MENDOZA at this time.  Pt is awaiting medical clearance. RN will remain available for pt's needs.

## 2018-11-18 ENCOUNTER — HOSPITAL ENCOUNTER (EMERGENCY)
Facility: OTHER | Age: 28
Discharge: HOME | End: 2018-11-18
Attending: EMERGENCY MEDICINE
Payer: MEDICAID

## 2018-11-18 VITALS
BODY MASS INDEX: 32.57 KG/M2 | SYSTOLIC BLOOD PRESSURE: 141 MMHG | RESPIRATION RATE: 16 BRPM | WEIGHT: 183.88 LBS | HEART RATE: 87 BPM | DIASTOLIC BLOOD PRESSURE: 97 MMHG | TEMPERATURE: 98 F | OXYGEN SATURATION: 100 %

## 2018-11-18 DIAGNOSIS — T69.022A TRENCH FOOT OF LEFT LOWER EXTREMITY, INITIAL ENCOUNTER: Primary | ICD-10-CM

## 2018-11-18 DIAGNOSIS — T69.021A TRENCH FOOT OF RIGHT LOWER EXTREMITY, INITIAL ENCOUNTER: ICD-10-CM

## 2018-11-18 LAB
B-HCG UR QL: NEGATIVE
CTP QC/QA: YES

## 2018-11-18 PROCEDURE — 25000003 PHARM REV CODE 250: Performed by: PHYSICIAN ASSISTANT

## 2018-11-18 PROCEDURE — 99284 EMERGENCY DEPT VISIT MOD MDM: CPT

## 2018-11-18 PROCEDURE — 81025 URINE PREGNANCY TEST: CPT | Performed by: EMERGENCY MEDICINE

## 2018-11-18 RX ORDER — CLOTRIMAZOLE 1 %
CREAM (GRAM) TOPICAL
Status: COMPLETED | OUTPATIENT
Start: 2018-11-18 | End: 2018-11-18

## 2018-11-18 RX ORDER — MUPIROCIN 20 MG/G
1 OINTMENT TOPICAL
Status: COMPLETED | OUTPATIENT
Start: 2018-11-18 | End: 2018-11-18

## 2018-11-18 RX ORDER — CLOTRIMAZOLE 1 %
CREAM (GRAM) TOPICAL
Qty: 15 G | Refills: 0 | Status: ON HOLD | OUTPATIENT
Start: 2018-11-18 | End: 2019-03-21 | Stop reason: HOSPADM

## 2018-11-18 RX ORDER — MUPIROCIN 20 MG/G
OINTMENT TOPICAL 3 TIMES DAILY
Qty: 22 G | Refills: 0 | Status: SHIPPED | OUTPATIENT
Start: 2018-11-18 | End: 2018-11-28

## 2018-11-18 RX ADMIN — MUPIROCIN 22 G: 20 OINTMENT TOPICAL at 06:11

## 2018-11-18 RX ADMIN — CLOTRIMAZOLE: 10 CREAM TOPICAL at 06:11

## 2018-11-18 NOTE — ED PROVIDER NOTES
Encounter Date: 11/18/2018       History     Chief Complaint   Patient presents with    Foot Pain     bilateral foot pain after pedicure. reports redness and open areas     Patient is 28 year old female with history of Crohn's, depression, hypertension who presents with complaints of foot pain and peeling skin to both feel. She reports that she had a pedicure about 3 weeks ago and she reports that she noticed about 3 days ago some skin peeling on her feet. She reports that she has been pulling off dead skin and admits that in doing this, she anciently creates cracks in her feet that start bleeding. She reports no associated fever, chills, nausea, vomiting, chest pain or SOB. She is currently unaccompanied int  ER.           Review of patient's allergies indicates:  No Known Allergies  Past Medical History:   Diagnosis Date    Crohn's disease     Depression     Hypertension      Past Surgical History:   Procedure Laterality Date    arm surgery      NO PAST SURGERIES       Family History   Problem Relation Age of Onset    COPD Neg Hx      Social History     Tobacco Use    Smoking status: Current Every Day Smoker    Smokeless tobacco: Never Used   Substance Use Topics    Alcohol use: No    Drug use: No     Review of Systems   Constitutional: Negative for fever.   HENT: Negative for sore throat.    Respiratory: Negative for shortness of breath.    Cardiovascular: Negative for chest pain.   Gastrointestinal: Negative for nausea.   Genitourinary: Negative for dysuria.   Musculoskeletal: Negative for back pain.   Skin: Negative for rash.        Sloughing skin to bilateral feet with skin cracks that are bleeding    Neurological: Negative for weakness.   Hematological: Does not bruise/bleed easily.       Physical Exam     Initial Vitals [11/18/18 1632]   BP Pulse Resp Temp SpO2   (!) 173/126 92 18 98.1 °F (36.7 °C) 98 %      MAP       --         Physical Exam    Nursing note and vitals reviewed.  Constitutional:  She appears well-developed and well-nourished. She is not diaphoretic. No distress.   Healthy appearing female in NAD or apparent pain. She makes good eye contact, speaks in clear full sentences and ambulates with ease.    HENT:   Head: Normocephalic and atraumatic.   Eyes: Conjunctivae and EOM are normal. Pupils are equal, round, and reactive to light. Right eye exhibits no discharge. Left eye exhibits no discharge. No scleral icterus.   Neck: Normal range of motion.   Cardiovascular: Normal rate, regular rhythm and normal heart sounds. Exam reveals no gallop and no friction rub.    No murmur heard.  Pulmonary/Chest: Breath sounds normal. She has no wheezes. She has no rhonchi. She has no rales.   Abdominal: Soft. Bowel sounds are normal. There is no tenderness. There is no rebound and no guarding.   Musculoskeletal: Normal range of motion. She exhibits no edema or tenderness.   Lymphadenopathy:     She has no cervical adenopathy.   Neurological: She is alert and oriented to person, place, and time. She has normal strength.   Skin: Skin is warm. Capillary refill takes less than 2 seconds. No rash and no abscess noted. No erythema.   See attached photo that was obtained with patient's verbal consent.  There is skin sloughing to bilateral feet with subsequent cracking and bleeding. There is some evidence of interdigital web sapcing fungal infection. There is no evidence of acute secondary bacterial infection.    Psychiatric: She has a normal mood and affect. Her behavior is normal. Thought content normal.         ED Course   Procedures  Labs Reviewed   POCT URINE PREGNANCY                   Imaging Results    None          Medical Decision Making:   ED Management:  Urgent evaluation of 28 year old female who presents with complaints most consistent with trench foot. She is afebrile, non-toxic appearing and hemodynamically stable thought hypertensive at 173/126. Physical exam outlined above and reveals skin sloughing  with cracks and bleeding. There is normal DP and PT pulses. No concern for acute osseous process.  No imaging felt warranted.  Will cover with Lotrimin as well as Bactroban and encourage follow-up with Dermatology.  Patient educated on ED return precautions and verbalizes understanding.  She is felt safe for discharge. Case discussed extensively with attending physician who also evaluated the patient and agrees with plan.  Other:   I have discussed this case with another health care provider.       <> Summary of the Discussion: Mini                      Clinical Impression:   The primary encounter diagnosis was Trench foot of left lower extremity, initial encounter. A diagnosis of Trench foot of right lower extremity, initial encounter was also pertinent to this visit.                             Karen Valenzuela PA-C  11/18/18 2000

## 2018-11-18 NOTE — ED TRIAGE NOTES
"Pt arrived to ED with c/o bilateral foot pain x3 days. Pt states " I think its because of my dry skin. I have been peeling it and I am getting cuts on my feet" both feel look severely dry and cracked with odor.   "

## 2019-02-13 ENCOUNTER — HOSPITAL ENCOUNTER (EMERGENCY)
Facility: OTHER | Age: 29
Discharge: LEFT BEFORE DISCHARGE | End: 2019-02-13
Payer: MEDICAID

## 2019-02-13 VITALS
HEIGHT: 59 IN | WEIGHT: 180 LBS | TEMPERATURE: 98 F | RESPIRATION RATE: 18 BRPM | BODY MASS INDEX: 36.29 KG/M2 | HEART RATE: 92 BPM | SYSTOLIC BLOOD PRESSURE: 148 MMHG | DIASTOLIC BLOOD PRESSURE: 91 MMHG | OXYGEN SATURATION: 99 %

## 2019-02-13 LAB
B-HCG UR QL: NEGATIVE
BILIRUB UR QL STRIP: NEGATIVE
CLARITY UR: CLEAR
COLOR UR: YELLOW
CTP QC/QA: YES
GLUCOSE UR QL STRIP: NEGATIVE
HGB UR QL STRIP: NEGATIVE
KETONES UR QL STRIP: NEGATIVE
LEUKOCYTE ESTERASE UR QL STRIP: NEGATIVE
NITRITE UR QL STRIP: NEGATIVE
PH UR STRIP: 6 [PH] (ref 5–8)
PROT UR QL STRIP: NEGATIVE
SP GR UR STRIP: 1.02 (ref 1–1.03)
URN SPEC COLLECT METH UR: NORMAL
UROBILINOGEN UR STRIP-ACNC: NEGATIVE EU/DL

## 2019-02-13 PROCEDURE — 81003 URINALYSIS AUTO W/O SCOPE: CPT

## 2019-02-13 PROCEDURE — 81025 URINE PREGNANCY TEST: CPT | Performed by: EMERGENCY MEDICINE

## 2019-02-13 PROCEDURE — 99900041 HC LEFT WITHOUT BEING SEEN- EMERGENCY

## 2019-02-13 NOTE — ED NOTES
"Pt in lobby, asking nurse "how much longer, I need to take my drug test"   RN stated the approximated number of people ahead of her, but that this number could change at any time, and pt verbalized understanding  "

## 2019-02-15 ENCOUNTER — HOSPITAL ENCOUNTER (EMERGENCY)
Facility: OTHER | Age: 29
Discharge: HOME | End: 2019-02-15
Attending: EMERGENCY MEDICINE
Payer: MEDICAID

## 2019-02-15 VITALS
RESPIRATION RATE: 18 BRPM | TEMPERATURE: 98 F | OXYGEN SATURATION: 98 % | SYSTOLIC BLOOD PRESSURE: 140 MMHG | BODY MASS INDEX: 38.29 KG/M2 | WEIGHT: 189.63 LBS | DIASTOLIC BLOOD PRESSURE: 90 MMHG | HEART RATE: 80 BPM

## 2019-02-15 DIAGNOSIS — R11.0 NAUSEA: Primary | ICD-10-CM

## 2019-02-15 LAB
B-HCG UR QL: NEGATIVE
CTP QC/QA: YES

## 2019-02-15 PROCEDURE — 25000003 PHARM REV CODE 250: Performed by: PHYSICIAN ASSISTANT

## 2019-02-15 PROCEDURE — 81025 URINE PREGNANCY TEST: CPT | Performed by: PHYSICIAN ASSISTANT

## 2019-02-15 PROCEDURE — 99283 EMERGENCY DEPT VISIT LOW MDM: CPT

## 2019-02-15 RX ORDER — ONDANSETRON 4 MG/1
4 TABLET, ORALLY DISINTEGRATING ORAL
Status: COMPLETED | OUTPATIENT
Start: 2019-02-15 | End: 2019-02-15

## 2019-02-15 RX ADMIN — ONDANSETRON 4 MG: 4 TABLET, ORALLY DISINTEGRATING ORAL at 11:02

## 2019-02-15 NOTE — ED TRIAGE NOTES
Pt reports nausea for the past three days. Pt denies vomiting or any other s/s. Pt states she is three days late for her cycle. She denies taking a home upt. Pt denies having a PCP

## 2019-02-22 ENCOUNTER — HOSPITAL ENCOUNTER (EMERGENCY)
Facility: OTHER | Age: 29
Discharge: HOME | End: 2019-02-22
Attending: EMERGENCY MEDICINE
Payer: MEDICAID

## 2019-02-22 VITALS
BODY MASS INDEX: 35.44 KG/M2 | HEIGHT: 63 IN | SYSTOLIC BLOOD PRESSURE: 98 MMHG | DIASTOLIC BLOOD PRESSURE: 54 MMHG | WEIGHT: 200 LBS | RESPIRATION RATE: 18 BRPM | TEMPERATURE: 98 F | HEART RATE: 96 BPM | OXYGEN SATURATION: 97 %

## 2019-02-22 DIAGNOSIS — R14.0 ABDOMINAL BLOATING: Primary | ICD-10-CM

## 2019-02-22 LAB
ALBUMIN SERPL BCP-MCNC: 3.6 G/DL
ALP SERPL-CCNC: 90 U/L
ALT SERPL W/O P-5'-P-CCNC: 41 U/L
ANION GAP SERPL CALC-SCNC: 13 MMOL/L
AST SERPL-CCNC: 30 U/L
BASOPHILS # BLD AUTO: 0.02 K/UL
BASOPHILS NFR BLD: 0.3 %
BILIRUB SERPL-MCNC: 0.2 MG/DL
BUN SERPL-MCNC: 12 MG/DL
CALCIUM SERPL-MCNC: 9 MG/DL
CHLORIDE SERPL-SCNC: 109 MMOL/L
CO2 SERPL-SCNC: 22 MMOL/L
CREAT SERPL-MCNC: 0.8 MG/DL
DIFFERENTIAL METHOD: ABNORMAL
EOSINOPHIL # BLD AUTO: 0 K/UL
EOSINOPHIL NFR BLD: 0.5 %
ERYTHROCYTE [DISTWIDTH] IN BLOOD BY AUTOMATED COUNT: 15.1 %
EST. GFR  (AFRICAN AMERICAN): >60 ML/MIN/1.73 M^2
EST. GFR  (NON AFRICAN AMERICAN): >60 ML/MIN/1.73 M^2
ETHANOL SERPL-MCNC: 151 MG/DL
GLUCOSE SERPL-MCNC: 112 MG/DL
HCT VFR BLD AUTO: 37.8 %
HGB BLD-MCNC: 12.1 G/DL
LIPASE SERPL-CCNC: 31 U/L
LYMPHOCYTES # BLD AUTO: 2.1 K/UL
LYMPHOCYTES NFR BLD: 32 %
MCH RBC QN AUTO: 27.3 PG
MCHC RBC AUTO-ENTMCNC: 32 G/DL
MCV RBC AUTO: 85 FL
MONOCYTES # BLD AUTO: 0.7 K/UL
MONOCYTES NFR BLD: 10.3 %
NEUTROPHILS # BLD AUTO: 3.7 K/UL
NEUTROPHILS NFR BLD: 56.6 %
PLATELET # BLD AUTO: 300 K/UL
PMV BLD AUTO: 11.1 FL
POCT GLUCOSE: 107 MG/DL (ref 70–110)
POTASSIUM SERPL-SCNC: 3.7 MMOL/L
PROT SERPL-MCNC: 7.9 G/DL
RBC # BLD AUTO: 4.43 M/UL
SODIUM SERPL-SCNC: 144 MMOL/L
WBC # BLD AUTO: 6.5 K/UL

## 2019-02-22 PROCEDURE — 96360 HYDRATION IV INFUSION INIT: CPT

## 2019-02-22 PROCEDURE — 96361 HYDRATE IV INFUSION ADD-ON: CPT

## 2019-02-22 PROCEDURE — 80053 COMPREHEN METABOLIC PANEL: CPT

## 2019-02-22 PROCEDURE — 80320 DRUG SCREEN QUANTALCOHOLS: CPT

## 2019-02-22 PROCEDURE — 99284 EMERGENCY DEPT VISIT MOD MDM: CPT | Mod: 25

## 2019-02-22 PROCEDURE — 85025 COMPLETE CBC W/AUTO DIFF WBC: CPT

## 2019-02-22 PROCEDURE — 25000003 PHARM REV CODE 250: Performed by: EMERGENCY MEDICINE

## 2019-02-22 PROCEDURE — 82962 GLUCOSE BLOOD TEST: CPT

## 2019-02-22 PROCEDURE — 83690 ASSAY OF LIPASE: CPT

## 2019-02-22 RX ORDER — FAMOTIDINE 20 MG/1
20 TABLET, FILM COATED ORAL
Status: COMPLETED | OUTPATIENT
Start: 2019-02-22 | End: 2019-02-22

## 2019-02-22 RX ORDER — MESALAMINE 800 MG/1
800 TABLET, DELAYED RELEASE ORAL 3 TIMES DAILY
Qty: 90 TABLET | Refills: 0 | Status: ON HOLD | OUTPATIENT
Start: 2019-02-22 | End: 2019-03-21 | Stop reason: HOSPADM

## 2019-02-22 RX ORDER — FAMOTIDINE 20 MG/1
20 TABLET, FILM COATED ORAL 2 TIMES DAILY PRN
Qty: 30 TABLET | Refills: 0 | Status: ON HOLD | OUTPATIENT
Start: 2019-02-22 | End: 2019-03-21 | Stop reason: HOSPADM

## 2019-02-22 RX ADMIN — SODIUM CHLORIDE 1000 ML: 0.9 INJECTION, SOLUTION INTRAVENOUS at 04:02

## 2019-02-22 RX ADMIN — FAMOTIDINE 20 MG: 20 TABLET ORAL at 04:02

## 2019-02-22 NOTE — ED PROVIDER NOTES
Encounter Date: 2/22/2019    SCRIBE #1 NOTE: I, Tigre Trinh, am scribing for, and in the presence of, Dr. Murillo.       History     Chief Complaint   Patient presents with    Abdominal Pain     onset a few days ago. reports PMH chrons, doesnt take any meds for it. denies NVD. denies blood in stool      Seen by provider: 4:24 AM    Patient is a 28 y.o. female with a history of crohn's disease who presents to the ED with complaint of abdominal bloating for two days. Patient reports mild associated abdominal pain as well as one episode of blood tinged diarrhea two days ago. She has not had any further bloody stools. Her last bowel movement was yesterday. She denies fever, chills, nausea, vomiting, or urinary symptoms. She is not currently on medications for her crohn's disease, but notes she has been on prednisone in the past. She admits to eating a poor diet with frequent spicy foods. She admits to use of alcohol tonight. She denies daily use of alcohol. She denies use of illicit drugs. She has no additional complaints at this time.      The history is provided by the patient.     Review of patient's allergies indicates:  No Known Allergies  Past Medical History:   Diagnosis Date    Crohn's disease     Depression     Hypertension      Past Surgical History:   Procedure Laterality Date    arm surgery      NO PAST SURGERIES       Family History   Problem Relation Age of Onset    COPD Neg Hx      Social History     Tobacco Use    Smoking status: Current Every Day Smoker    Smokeless tobacco: Never Used   Substance Use Topics    Alcohol use: No    Drug use: No     Review of Systems   Constitutional: Negative for fever.   HENT: Negative for sore throat.    Eyes: Negative for redness.   Respiratory: Negative for shortness of breath.    Cardiovascular: Negative for chest pain.   Gastrointestinal: Positive for abdominal distention, abdominal pain, blood in stool (resolved) and diarrhea (resolved). Negative  for nausea and vomiting.   Genitourinary: Negative for dysuria.   Musculoskeletal: Negative for back pain.   Skin: Negative for rash.   Neurological: Negative for weakness.   Psychiatric/Behavioral: Negative for confusion.       Physical Exam     Initial Vitals   BP Pulse Resp Temp SpO2   02/22/19 0233 02/22/19 0124 02/22/19 0124 02/22/19 0124 02/22/19 0124   107/87 100 16 98.1 °F (36.7 °C) (!) 93 %      MAP       --                Physical Exam    Nursing note and vitals reviewed.  Constitutional: She appears well-developed and well-nourished. She is not diaphoretic. No distress.   Mildly intoxicated.    HENT:   Head: Normocephalic and atraumatic.   Dry mucous membranes.   Eyes: Conjunctivae and EOM are normal. Pupils are equal, round, and reactive to light.   Neck: Normal range of motion. Neck supple.   Cardiovascular: Normal rate, regular rhythm, normal heart sounds and normal pulses.   No murmur heard.  Pulmonary/Chest: Breath sounds normal. No respiratory distress. She has no wheezes. She has no rhonchi. She has no rales.   Abdominal: Soft. There is no tenderness. There is no rebound and no guarding.   Neurological: She is alert and oriented to person, place, and time. She has normal strength. No cranial nerve deficit.   Skin: Skin is warm and dry.   Psychiatric: She has a normal mood and affect. Her behavior is normal. Thought content normal.         ED Course   Procedures  Labs Reviewed   CBC W/ AUTO DIFFERENTIAL - Abnormal; Notable for the following components:       Result Value    RDW 15.1 (*)     All other components within normal limits    Narrative:     For upper or mid abdominal pain.   COMPREHENSIVE METABOLIC PANEL - Abnormal; Notable for the following components:    CO2 22 (*)     Glucose 112 (*)     All other components within normal limits    Narrative:     For upper or mid abdominal pain.   ALCOHOL,MEDICAL (ETHANOL) - Abnormal; Notable for the following components:    Alcohol, Medical, Serum 151  (*)     All other components within normal limits   LIPASE    Narrative:     For upper or mid abdominal pain.   POCT GLUCOSE          Imaging Results    None          Medical Decision Making:   Initial Assessment:       29-year-old female with reported history of Crohn's disease presents with abdominal bloating for 2 days, and 1 episode of blood in stool 2 days ago but none since then.  She is no longer on any meds for Crohn's disease for the past 2 years, due to improved symptoms and lack of follow-up; she has had a few ED visits for this but no significant findings of IBD exacerbation found.  On arrival patient is intoxicated, admits to drinking alcohol PTA, with no abdominal tenderness or distention or ascites.  No active bloody stools, fevers, signs of abscess or fistula, or other concerning findings.     Basic labs checked with normal WBC, no anemia, normal LFT/lipase, and ETOH level 151.  No indication for emergent CT abdomen or imaging at this time since no sign of significant Crohn's flare or complications.  Patient admits to diet heavy and spicy and greasy foods, and occasional social heavy EtOH use, which could cause mild gastritis and subsequent sensation of bloating.  No sign active GI bleed, liver disease, or hemorrhagic gastritis.      Patient was observed in the ED and on reassessment is ambulatory at baseline and able to have coherent discussion about treatment plan. Patient is requesting medications for Crohn's since she states she will not be able to see her GI for another few months.  Will Rx Pepcid b.i.d. to cover component of gastritis as well as mesalamine; she will call her GI tomorrow to see if she should start taking this.  Patient advised to modify diet and decreased alcohol use to avoid these symptoms, and return to the ED for any abdominal pain or any other concerns.      Clinical Tests:   Lab Tests: Ordered and Reviewed            Scribe Attestation:   Scribe #1: I performed the above  scribed service and the documentation accurately describes the services I performed. I attest to the accuracy of the note.    Attending Attestation:           Physician Attestation for Scribe:  Physician Attestation Statement for Scribe #1: I, Dr. Murillo, reviewed documentation, as scribed by Tigre Trinh in my presence, and it is both accurate and complete.                    Clinical Impression:     1. Abdominal bloating                                  Rodrick Murillo MD  02/25/19 104

## 2019-02-22 NOTE — ED NOTES
"Pt presented to ED via POV with complaints of subjective "abd pain". On arrival pt appears calm with slurred speech and unsteady gait, etoh noted on breath, per EMS pt was found at bottom of ramp and proceeded to help pt enter ED. Pt currently sleeping in bed RR easy non labored, NAD. AAOx4, VSS, arousal able to voice. Continuous blood pressure, pulse ox and cardiac monitoring in place, side rails up x2, call light within reach, bed in lowest locked position, will continue to monitor and assess for changes.   "

## 2019-02-22 NOTE — ED NOTES
Pt ambulates down zimmerman with steady gait, negative slurred speech or nystagmus noted. Will prepare for discharge

## 2019-02-22 NOTE — ED NOTES
Pt was at the bottom of the ramp and assisted up in a wheelchair by a paramedic that was dropping off another pt. Patient proceeded to curse at the paramedic that was helping her when he asked her questions about why she was here to see if he could assist her.

## 2019-02-22 NOTE — ED NOTES
Pt instructed on need of urine specimen as soon as possible, verbalizes understanding and will provide

## 2019-03-14 ENCOUNTER — HOSPITAL ENCOUNTER (EMERGENCY)
Facility: OTHER | Age: 29
Discharge: PSYCHIATRIC OR SUBSTANCE USE FACILITY | End: 2019-03-14
Attending: EMERGENCY MEDICINE
Payer: MEDICAID

## 2019-03-14 VITALS
HEIGHT: 61 IN | WEIGHT: 184.75 LBS | BODY MASS INDEX: 34.88 KG/M2 | TEMPERATURE: 98 F | OXYGEN SATURATION: 98 % | SYSTOLIC BLOOD PRESSURE: 145 MMHG | DIASTOLIC BLOOD PRESSURE: 92 MMHG | RESPIRATION RATE: 16 BRPM | HEART RATE: 74 BPM

## 2019-03-14 DIAGNOSIS — R45.851 SUICIDAL IDEATIONS: Primary | ICD-10-CM

## 2019-03-14 PROBLEM — F32.A DEPRESSION WITH SUICIDAL IDEATION: Status: ACTIVE | Noted: 2019-03-14

## 2019-03-14 LAB
AMPHET+METHAMPHET UR QL: NEGATIVE
ANION GAP SERPL CALC-SCNC: 11 MMOL/L
APAP SERPL-MCNC: <3 UG/ML
B-HCG UR QL: NEGATIVE
BACTERIA #/AREA URNS HPF: ABNORMAL /HPF
BARBITURATES UR QL SCN>200 NG/ML: NEGATIVE
BASOPHILS # BLD AUTO: 0.01 K/UL
BASOPHILS NFR BLD: 0.2 %
BENZODIAZ UR QL SCN>200 NG/ML: NEGATIVE
BILIRUB UR QL STRIP: ABNORMAL
BUN SERPL-MCNC: 11 MG/DL
BZE UR QL SCN: ABNORMAL
CALCIUM SERPL-MCNC: 9.3 MG/DL
CANNABINOIDS UR QL SCN: NEGATIVE
CHLORIDE SERPL-SCNC: 108 MMOL/L
CLARITY UR: ABNORMAL
CO2 SERPL-SCNC: 22 MMOL/L
COLOR UR: YELLOW
CREAT SERPL-MCNC: 0.8 MG/DL
CREAT UR-MCNC: 412.3 MG/DL
CTP QC/QA: YES
DIFFERENTIAL METHOD: ABNORMAL
EOSINOPHIL # BLD AUTO: 0.1 K/UL
EOSINOPHIL NFR BLD: 1 %
ERYTHROCYTE [DISTWIDTH] IN BLOOD BY AUTOMATED COUNT: 14.9 %
EST. GFR  (AFRICAN AMERICAN): >60 ML/MIN/1.73 M^2
EST. GFR  (NON AFRICAN AMERICAN): >60 ML/MIN/1.73 M^2
ETHANOL SERPL-MCNC: <10 MG/DL
GLUCOSE SERPL-MCNC: 108 MG/DL
GLUCOSE UR QL STRIP: NEGATIVE
HCT VFR BLD AUTO: 37.9 %
HGB BLD-MCNC: 12.1 G/DL
HGB UR QL STRIP: NEGATIVE
HYALINE CASTS #/AREA URNS LPF: 4 /LPF
KETONES UR QL STRIP: ABNORMAL
LEUKOCYTE ESTERASE UR QL STRIP: NEGATIVE
LYMPHOCYTES # BLD AUTO: 1.1 K/UL
LYMPHOCYTES NFR BLD: 22.9 %
MCH RBC QN AUTO: 27.7 PG
MCHC RBC AUTO-ENTMCNC: 31.9 G/DL
MCV RBC AUTO: 87 FL
METHADONE UR QL SCN>300 NG/ML: NEGATIVE
MICROSCOPIC COMMENT: ABNORMAL
MONOCYTES # BLD AUTO: 0.7 K/UL
MONOCYTES NFR BLD: 13.8 %
NEUTROPHILS # BLD AUTO: 3.1 K/UL
NEUTROPHILS NFR BLD: 61.9 %
NITRITE UR QL STRIP: NEGATIVE
OPIATES UR QL SCN: NEGATIVE
PCP UR QL SCN>25 NG/ML: NEGATIVE
PH UR STRIP: 7 [PH] (ref 5–8)
PLATELET # BLD AUTO: 229 K/UL
PMV BLD AUTO: 11.2 FL
POTASSIUM SERPL-SCNC: 3.8 MMOL/L
PROT UR QL STRIP: ABNORMAL
RBC # BLD AUTO: 4.37 M/UL
RBC #/AREA URNS HPF: 1 /HPF (ref 0–4)
SODIUM SERPL-SCNC: 141 MMOL/L
SP GR UR STRIP: 1.02 (ref 1–1.03)
TOXICOLOGY INFORMATION: ABNORMAL
TSH SERPL DL<=0.005 MIU/L-ACNC: 0.83 UIU/ML
URN SPEC COLLECT METH UR: ABNORMAL
UROBILINOGEN UR STRIP-ACNC: 1 EU/DL
WBC # BLD AUTO: 4.93 K/UL
WBC #/AREA URNS HPF: 12 /HPF (ref 0–5)

## 2019-03-14 PROCEDURE — 80329 ANALGESICS NON-OPIOID 1 OR 2: CPT

## 2019-03-14 PROCEDURE — 80320 DRUG SCREEN QUANTALCOHOLS: CPT

## 2019-03-14 PROCEDURE — 81000 URINALYSIS NONAUTO W/SCOPE: CPT

## 2019-03-14 PROCEDURE — 99282 EMERGENCY DEPT VISIT SF MDM: CPT | Mod: GT,,, | Performed by: PSYCHIATRY & NEUROLOGY

## 2019-03-14 PROCEDURE — 80307 DRUG TEST PRSMV CHEM ANLYZR: CPT

## 2019-03-14 PROCEDURE — 80048 BASIC METABOLIC PNL TOTAL CA: CPT

## 2019-03-14 PROCEDURE — 99282 PR EMERGENCY DEPT VISIT,LEVEL II: ICD-10-PCS | Mod: GT,,, | Performed by: PSYCHIATRY & NEUROLOGY

## 2019-03-14 PROCEDURE — 85025 COMPLETE CBC W/AUTO DIFF WBC: CPT

## 2019-03-14 PROCEDURE — 87086 URINE CULTURE/COLONY COUNT: CPT

## 2019-03-14 PROCEDURE — 81025 URINE PREGNANCY TEST: CPT | Performed by: EMERGENCY MEDICINE

## 2019-03-14 PROCEDURE — 84443 ASSAY THYROID STIM HORMONE: CPT

## 2019-03-14 PROCEDURE — 99285 EMERGENCY DEPT VISIT HI MDM: CPT

## 2019-03-14 NOTE — ED NOTES
Security called to ED for inventory of pt belongings. Pt belongings placed into green bags with pt labels per PEC policy.

## 2019-03-14 NOTE — ED NOTES
Gisselle AWAN aware of ordered telemedicine psych consult, awaiting Psych MD Lujan to call for consult.

## 2019-03-14 NOTE — CONSULTS
"Tele-Consultation to Emergency Department from Psychiatry    Please see previous notes:    Patient agreeable to consultation via telepsychiatry.    Consultation started: 3/14/2019 at  5;05pm   The chief complaint leading to psychiatric consultation is: SI  This consultation was requested by Evaristo Grider DO, the Emergency Department attending physician.  The location of the consulting psychiatrist is [address] [place] [address].  The patient location is [facility].  The patient arrived at the ED at: 2;07pm    Also present with the patient at the time of the consultation: edson    Patient Identification:  Patient information was obtained from patient and past medical records.  Patient presented voluntarily to the Emergency Department ambulatory.    History of Present Illness:  José Manuel Vines is a 29 y.o. Female. With past psychiatric h/o severe alcohol use d/o, caocine use d/o and MDD presents voluntarily to the ED c/o Suicidal ideations x 3 days with plan today of OD.   She reports that over the past months her mood has been down most of the days but denied any hopelessness. She also endorses + changes in sleep, irritability. She denied any HI/AVH or psychotic symptoms. Pt reports drinking up to 3 bottles of "vodka gin" a day since she got out of [facility] in psych facility earlier this year. She also admits to using "coke" 2 times a day for the past month. Pt kept asking if we were done with the interview and mostly provided edith answers without much details. Pts affect is withdrawn blunted guarded and non reactive.   She currently continues to report having SI with plan to OD and unable to provide any collateral info or a safety plan.     Psychiatric History:   Hospitalization: Yes, [facility] couple of months   Medication Trials: Yes  Suicide Attempts: yes mostly   Violence: unknown  Depression: yes  Devorah: none  AH's: denied  Delusions: denied    Review of Systems:  History obtained " "from unobtainable from patient due to lack of cooperation    Past Medical History:   Past Medical History:   Diagnosis Date    Crohn's disease     Depression     Hypertension         Seizures: none  Head trauma/l.o.c.:none  Wish to become pregnant[if female of childbearing age]: didnt assess    Allergies:   Review of patient's allergies indicates:  No Known Allergies    Medications in ER: Medications - No data to display    Medications at home: none    Substance Abuse History:   Alchohol:yes see hpi   Drug: yes    Legal History:   Past charges/incarcerations: none  Pending charges: none    Family Psychiatric History: none    Social History:   History of Physical/Sexual Abuse: denid  Education: 6th  Employment/Disability: Disability:  Financial: diffuclt  Relationship Status/Sexual Orientation: none  Children: none  Housing Status: stay with firends  Roman Catholic: didn't assess   History: none  Recreational Activities: didn't assess  Access to Gun: none    Current Evaluation:     Constitutional  Vitals:  Vitals:    03/14/19 1352   BP: 135/78   Pulse: 102   Resp: 18   Temp: 97.5 °F (36.4 °C)   TempSrc: Oral   SpO2: 98%   Weight: 83.8 kg (184 lb 11.9 oz)   Height: 5' 1" (1.549 m)      General:  unremarkable, age appropriate     Musculoskeletal  Muscle Strength/Tone:   moving arms normally   Gait & Station:   sitting on stretcher     Psychiatric  Level of Consciousness: alert  Orientation: oriented to person, place and time  Grooming: in hospital gown  Psychomotor Behavior: no agitation  Speech: minimal one word responses, loud volume  Language: uses words appropriately  Mood: "depressed"  Affect: congruent withdrawn blunted guarded and non reactive  Thought Process: linear but somewhat limited due to pts not participating in the interview fully   Associations: none  Thought Content: + SI no HI/AVH  Memory: unable to assess  Attention: intact to interview  Fund of Knowledge:  unable to assess  Insight: " limited  Judgement: impaired    Relevant Elements of Neurological Exam: no abnormality of posture noted    Assessment - Diagnosis - Goals:     Diagnosis/Impression:   Substance induced mood d/o  Suicidal ideations  Alcohol use d/o   Cocaine use d/o     Rec:   Dispo- Once medically cleared; Seek Involuntary Inpt psychiatric admission for stabilization of acute psychiatric symptoms.  Please re-consult for further follow up or reassessment     Psych meds  ALCOHOL/BENZO WITHDRAWAL PRECAUTIONS  Ativan 2 mg q4 hours prn SBP> 160, DBP>105 and HR> 110. Notify primary MD.  Vital signs q4 hours  Thiamine, Folic acid, Vit B12 and Multivitamin supplementation    Legal-Seek/continue PEC because pt is in imminent danger of hurting self and is gravely disabled.     More than 50% of the time was spent counseling/coordinating care    Laboratory Data:   Labs Reviewed   CBC W/ AUTO DIFFERENTIAL - Abnormal; Notable for the following components:       Result Value    MCHC 31.9 (*)     RDW 14.9 (*)     All other components within normal limits   URINALYSIS, REFLEX TO URINE CULTURE - Abnormal; Notable for the following components:    Appearance, UA Cloudy (*)     Protein, UA 1+ (*)     Ketones, UA Trace (*)     Bilirubin (UA) 1+ (*)     All other components within normal limits    Narrative:     Preferred Collection Type->Urine, Clean Catch   DRUG SCREEN PANEL, URINE EMERGENCY - Abnormal; Notable for the following components:    Creatinine, Random Ur 412.3 (*)     All other components within normal limits    Narrative:     Preferred Collection Type->Urine, Clean Catch   ACETAMINOPHEN LEVEL - Abnormal; Notable for the following components:    Acetaminophen (Tylenol), Serum <3.0 (*)     All other components within normal limits   BASIC METABOLIC PANEL - Abnormal; Notable for the following components:    CO2 22 (*)     All other components within normal limits   URINALYSIS MICROSCOPIC - Abnormal; Notable for the following components:    WBC,  UA 12 (*)     Bacteria, UA Many (*)     Hyaline Casts, UA 4 (*)     All other components within normal limits    Narrative:     Preferred Collection Type->Urine, Clean Catch   CULTURE, URINE   TSH   ALCOHOL,MEDICAL (ETHANOL)   POCT URINE PREGNANCY         Consulting clinician was informed of the encounter and consult note.    Consultation ended: 3/14/2019 at 5;22pm

## 2019-03-14 NOTE — ED NOTES
Appearance: Pt awake, alert & oriented to person, place & time. Pt in no acute distress at present time. Pt is clean and well groomed with clothes appropriately fastened.   Skin: Skin warm, dry & intact. Color consistent with ethnicity. Mucous membranes moist. No breakdown or brusing noted.   Musculoskeletal: Patient moving all extremities well, no obvious swelling or deformities noted.   Respiratory: Respirations spontaneous, even, and non-labored. Visible chest rise noted. Airway is open and patent. No accessory muscle use noted. Reporting recent cough.   Neurologic: Sensation is intact. Speech is clear and appropriate. Eyes open spontaneously, behavior appropriate to situation, follows commands, facial expression symmetrical, bilateral hand grasp equal and even, purposeful motor response noted.  Cardiac: All peripheral pulses present. No Bilateral lower extremity edema. Cap refill is <3 seconds. Pt does report mid sternal CP at this time.  Denies SOB, dizziness, blurred vision, weakness or fatigue at this time.   Abdomen: Pt denies active abd pains, cramping or discomfort, No N/V/D at this time.   : Pt reports no dysuria or hematuria.

## 2019-03-14 NOTE — ED NOTES
Pt refusing to sign mental health rights and and transfer consent. Both documents signed by 2 RN's.

## 2019-03-14 NOTE — ED NOTES
Belongings:  Gold purse -ID,14cents, [place] purchase card  Shoes  Jeans  Socks  Shirt  Multiple Underwear  Earrings  facewash  Two brushes  deoderant  Yellow Shorts  Phone

## 2019-03-14 NOTE — ED NOTES
"Pt to ED, + SI, reporting plan is to overdose on "pills." Pt reporting PMH of depression, reporting she has tried to commit suicide in the past from overdosing on pills. Pt denies HI, auditory/visual hallucinations. Pt with normal affect, is calm and cooperative at this time. Pt does admit to heavy alchohol consumption and admits to using using cocaine x yesterday, at around 2000. Pt AAOx4 and appropriate at this time. Respirations even and unlabored. No acute distress noted. ED Bethany sandhu, remains at BS in direct visual contact of pt at this time.   "

## 2019-03-14 NOTE — ED NOTES
Pt was escorted to ED room 6 with ED staff, Matthias to room 6. All equipment removed from room PTA.

## 2019-03-14 NOTE — ED NOTES
Assumed care of patient. Patient resting comfortably in bed, edson Rizzo at bedside in direct line of site

## 2019-03-14 NOTE — ED NOTES
Pt observed by Bethany Henry, and placed into blue PEC designated top and bottom. Belongings are at nurses station. Awaiting security for inventory.

## 2019-03-14 NOTE — ED PROVIDER NOTES
"Encounter Date: 3/14/2019    SCRIBE #1 NOTE: I, Klaudia Johnson, am scribing for, and in the presence of, Dr. Grider.       History     Chief Complaint   Patient presents with    Suicidal     Pt reports she is having suicidal thoughts over overdosing on drugs for the past two days. Pt denies HI, admits to AH, denies VH. Pt also requesting to go to rehab for alcohol abuse. Pt admits to being out of [facility] for the past three days. Last drink and cocaine use was last night     Time seen by provider: 2:16 PM    This is a 29 y.o. female with hx of HTN and depression who presents with complaint of SI today. She reports feeling suicidal for three days. She denies having a plan of suicide. She denies HI or AH/VH. She denies being seen by psychiatry. She does not recall the last time she was hospitalized at a psychiatric facility. She wants to go to rehab for cocaine and alcohol abuse. She reports drinking "too much liquor" and last cocaine use was last night. She states she was staying at [facility], though is now staying in a friend's garage. She has no other complaints.      The history is provided by the patient.     Review of patient's allergies indicates:  No Known Allergies  Past Medical History:   Diagnosis Date    Crohn's disease     Depression     Hypertension      Past Surgical History:   Procedure Laterality Date    arm surgery      NO PAST SURGERIES       Family History   Problem Relation Age of Onset    COPD Neg Hx      Social History     Tobacco Use    Smoking status: Current Every Day Smoker    Smokeless tobacco: Never Used   Substance Use Topics    Alcohol use: No    Drug use: No     Review of Systems   Constitutional: Negative for fever.   HENT: Negative for sore throat.    Respiratory: Negative for shortness of breath.    Cardiovascular: Negative for chest pain.   Gastrointestinal: Negative for nausea.   Genitourinary: Negative for dysuria.   Musculoskeletal: Negative for back pain. "   Skin: Negative for rash.   Neurological: Negative for weakness.   Hematological: Does not bruise/bleed easily.   Psychiatric/Behavioral: Positive for suicidal ideas. Negative for hallucinations.        Negative for HI.       Physical Exam     Initial Vitals [03/14/19 1352]   BP Pulse Resp Temp SpO2   135/78 102 18 97.5 °F (36.4 °C) 98 %      MAP       --         Physical Exam    Nursing note and vitals reviewed.  Constitutional: She appears well-developed and well-nourished. She is not diaphoretic. No distress.   HENT:   Head: Normocephalic and atraumatic.   Eyes: EOM are normal. No scleral icterus.   Neck: Normal range of motion. Neck supple.   Cardiovascular: Normal rate, regular rhythm and normal heart sounds. Exam reveals no gallop and no friction rub.    No murmur heard.  Pulmonary/Chest: Breath sounds normal. No respiratory distress. She has no wheezes. She has no rhonchi. She has no rales.   Musculoskeletal: Normal range of motion. She exhibits no edema or tenderness.   Neurological: She is alert and oriented to person, place, and time.   Skin: Skin is warm and dry.   Psychiatric: She has a normal mood and affect. Her behavior is normal. She expresses suicidal ideation. She expresses no homicidal ideation. She expresses no suicidal plans.         ED Course   Procedures  Labs Reviewed   CBC W/ AUTO DIFFERENTIAL - Abnormal; Notable for the following components:       Result Value    MCHC 31.9 (*)     RDW 14.9 (*)     All other components within normal limits   URINALYSIS, REFLEX TO URINE CULTURE - Abnormal; Notable for the following components:    Appearance, UA Cloudy (*)     Protein, UA 1+ (*)     Ketones, UA Trace (*)     Bilirubin (UA) 1+ (*)     All other components within normal limits    Narrative:     Preferred Collection Type->Urine, Clean Catch   DRUG SCREEN PANEL, URINE EMERGENCY - Abnormal; Notable for the following components:    Creatinine, Random Ur 412.3 (*)     All other components within  normal limits    Narrative:     Preferred Collection Type->Urine, Clean Catch   ACETAMINOPHEN LEVEL - Abnormal; Notable for the following components:    Acetaminophen (Tylenol), Serum <3.0 (*)     All other components within normal limits   BASIC METABOLIC PANEL - Abnormal; Notable for the following components:    CO2 22 (*)     All other components within normal limits   URINALYSIS MICROSCOPIC - Abnormal; Notable for the following components:    WBC, UA 12 (*)     Bacteria, UA Many (*)     Hyaline Casts, UA 4 (*)     All other components within normal limits    Narrative:     Preferred Collection Type->Urine, Clean Catch   CULTURE, URINE   TSH   ALCOHOL,MEDICAL (ETHANOL)   POCT URINE PREGNANCY          Imaging Results    None          Medical Decision Making:   History:   Old Medical Records: I decided to obtain old medical records.  Old Records Summarized: records from another hospital.       <> Summary of Records: Patient was inpatient at [facility] in 10/2018 for 6 days. That appears to be the last time she had inpatient psych hold.  Clinical Tests:   Lab Tests: Ordered and Reviewed  ED Management:  29-year-old female presents with suicidal ideations.  History of depression and suicidal ideations in the past.  Also history of cocaine and alcohol abuse.  Based on her history I did review medical records.  Never had a history of malingering.  Will place on a PEC and get the labs that are required by the receiving facilities however medically these are and not indicated for medical clearance.  The patient is medically cleared for psychiatric evaluation.    Will also have tele psychiatry evaluate the patient.    5:22 PM I spoke with Dr Lujan. She recommended keeping PEC and transfer for inpatient psych.    This is the extent to the patients complaints today here in the emergency department.            Scribe Attestation:   Scribe #1: I performed the above scribed service and the documentation accurately describes the  services I performed. I attest to the accuracy of the note.    Attending Attestation:           Physician Attestation for Scribe:  Physician Attestation Statement for Scribe #1: I, Dr. Grider, reviewed documentation, as scribed by Klaudia Johnson in my presence, and it is both accurate and complete.                    Clinical Impression:     1. Suicidal ideations                                 Evaristo Grider, DO  03/14/19 1721

## 2019-03-15 PROBLEM — F10.10 ALCOHOL ABUSE: Status: ACTIVE | Noted: 2019-03-15

## 2019-03-15 PROBLEM — I10 HYPERTENSION: Status: ACTIVE | Noted: 2019-03-15

## 2019-03-15 PROBLEM — K50.90 CROHN'S DISEASE: Status: ACTIVE | Noted: 2019-03-15

## 2019-03-15 PROBLEM — F17.210 CIGARETTE NICOTINE DEPENDENCE WITHOUT COMPLICATION: Status: ACTIVE | Noted: 2019-03-15

## 2019-03-15 LAB
BACTERIA UR CULT: NORMAL
BACTERIA UR CULT: NORMAL

## 2019-03-15 NOTE — ED NOTES
Admit [facility] fax[facility], [facility], and [facility].

## 2019-03-15 NOTE — ED NOTES
Pt rounding complete.  Pain 0/10.  Restroom and comfort needs addressed.  Pt updated on plan of care.  edson Abrams, at bedside for direct observation.  Will continue to monitor.

## 2019-03-15 NOTE — ED NOTES
Admit packet faxed to [facility], [facility], and [facility].

## 2019-03-15 NOTE — ED NOTES
Pt rounding complete.  Pain 0/10.  Restroom and comfort needs addressed.  Pt updated on plan of care.  Kiki Abrams remains at bedside for direct observation.  Will continue to monitor.

## 2019-03-15 NOTE — ED NOTES
Patient accepted by Jack at [facility] ([address] [facility], [place], [place]) for the service of Dr. Espinosa.  Report to be called to [phone].

## 2019-03-15 NOTE — ED NOTES
Pt rounding complete.  Pt denies pain at present time, pt provided with juice and sandwich per pt request.  Restroom and comfort needs addressed.  Pt updated on plan of care.  Kiki Abrams remains at bedside for direct observation.  Will continue to monitor.

## 2019-04-24 ENCOUNTER — HOSPITAL ENCOUNTER (EMERGENCY)
Facility: OTHER | Age: 29
Discharge: PSYCHIATRIC OR SUBSTANCE USE FACILITY | End: 2019-04-24
Attending: EMERGENCY MEDICINE
Payer: MEDICAID

## 2019-04-24 VITALS
DIASTOLIC BLOOD PRESSURE: 104 MMHG | RESPIRATION RATE: 18 BRPM | TEMPERATURE: 99 F | SYSTOLIC BLOOD PRESSURE: 155 MMHG | WEIGHT: 190 LBS | OXYGEN SATURATION: 97 % | HEIGHT: 61 IN | BODY MASS INDEX: 35.87 KG/M2 | HEART RATE: 95 BPM

## 2019-04-24 DIAGNOSIS — F10.10 ALCOHOL ABUSE: ICD-10-CM

## 2019-04-24 DIAGNOSIS — R45.851 SUICIDAL IDEATION: ICD-10-CM

## 2019-04-24 DIAGNOSIS — R45.851 SUICIDAL IDEATIONS: ICD-10-CM

## 2019-04-24 DIAGNOSIS — F19.94 SUBSTANCE INDUCED MOOD DISORDER: ICD-10-CM

## 2019-04-24 DIAGNOSIS — F32.A DEPRESSION, UNSPECIFIED DEPRESSION TYPE: Primary | ICD-10-CM

## 2019-04-24 PROBLEM — F32.9 MAJOR DEPRESSION: Status: ACTIVE | Noted: 2019-04-24

## 2019-04-24 LAB
ALBUMIN SERPL BCP-MCNC: 3.5 G/DL (ref 3.5–5.2)
ALP SERPL-CCNC: 96 U/L (ref 55–135)
ALT SERPL W/O P-5'-P-CCNC: 23 U/L (ref 10–44)
AMPHET+METHAMPHET UR QL: NEGATIVE
ANION GAP SERPL CALC-SCNC: 12 MMOL/L (ref 8–16)
APAP SERPL-MCNC: <3 UG/ML (ref 10–20)
AST SERPL-CCNC: 31 U/L (ref 10–40)
B-HCG UR QL: NEGATIVE
BARBITURATES UR QL SCN>200 NG/ML: NEGATIVE
BASOPHILS # BLD AUTO: 0 K/UL (ref 0–0.2)
BASOPHILS NFR BLD: 0 % (ref 0–1.9)
BENZODIAZ UR QL SCN>200 NG/ML: NEGATIVE
BILIRUB SERPL-MCNC: 0.5 MG/DL (ref 0.1–1)
BILIRUB UR QL STRIP: NEGATIVE
BUN SERPL-MCNC: 11 MG/DL (ref 6–20)
BZE UR QL SCN: NORMAL
CALCIUM SERPL-MCNC: 9.5 MG/DL (ref 8.7–10.5)
CANNABINOIDS UR QL SCN: NEGATIVE
CHLORIDE SERPL-SCNC: 104 MMOL/L (ref 95–110)
CLARITY UR: CLEAR
CO2 SERPL-SCNC: 26 MMOL/L (ref 23–29)
COLOR UR: YELLOW
CREAT SERPL-MCNC: 0.7 MG/DL (ref 0.5–1.4)
CREAT UR-MCNC: 126.1 MG/DL (ref 15–325)
CTP QC/QA: YES
DIFFERENTIAL METHOD: ABNORMAL
EOSINOPHIL # BLD AUTO: 0.1 K/UL (ref 0–0.5)
EOSINOPHIL NFR BLD: 1.4 % (ref 0–8)
ERYTHROCYTE [DISTWIDTH] IN BLOOD BY AUTOMATED COUNT: 14.5 % (ref 11.5–14.5)
EST. GFR  (AFRICAN AMERICAN): >60 ML/MIN/1.73 M^2
EST. GFR  (NON AFRICAN AMERICAN): >60 ML/MIN/1.73 M^2
ETHANOL SERPL-MCNC: <10 MG/DL
GLUCOSE SERPL-MCNC: 85 MG/DL (ref 70–110)
GLUCOSE UR QL STRIP: NEGATIVE
HCT VFR BLD AUTO: 34.8 % (ref 37–48.5)
HGB BLD-MCNC: 11.5 G/DL (ref 12–16)
HGB UR QL STRIP: NEGATIVE
KETONES UR QL STRIP: NEGATIVE
LEUKOCYTE ESTERASE UR QL STRIP: NEGATIVE
LYMPHOCYTES # BLD AUTO: 1.4 K/UL (ref 1–4.8)
LYMPHOCYTES NFR BLD: 32.4 % (ref 18–48)
MCH RBC QN AUTO: 27.9 PG (ref 27–31)
MCHC RBC AUTO-ENTMCNC: 33 G/DL (ref 32–36)
MCV RBC AUTO: 85 FL (ref 82–98)
METHADONE UR QL SCN>300 NG/ML: NEGATIVE
MONOCYTES # BLD AUTO: 0.4 K/UL (ref 0.3–1)
MONOCYTES NFR BLD: 8.6 % (ref 4–15)
NEUTROPHILS # BLD AUTO: 2.5 K/UL (ref 1.8–7.7)
NEUTROPHILS NFR BLD: 57.6 % (ref 38–73)
NITRITE UR QL STRIP: NEGATIVE
OPIATES UR QL SCN: NEGATIVE
PCP UR QL SCN>25 NG/ML: NEGATIVE
PH UR STRIP: 8 [PH] (ref 5–8)
PLATELET # BLD AUTO: 187 K/UL (ref 150–350)
PMV BLD AUTO: 10.7 FL (ref 9.2–12.9)
POTASSIUM SERPL-SCNC: 3.6 MMOL/L (ref 3.5–5.1)
PROT SERPL-MCNC: 7.5 G/DL (ref 6–8.4)
PROT UR QL STRIP: NEGATIVE
RBC # BLD AUTO: 4.12 M/UL (ref 4–5.4)
SALICYLATES SERPL-MCNC: <5 MG/DL (ref 15–30)
SODIUM SERPL-SCNC: 142 MMOL/L (ref 136–145)
SP GR UR STRIP: 1.01 (ref 1–1.03)
TOXICOLOGY INFORMATION: NORMAL
TSH SERPL DL<=0.005 MIU/L-ACNC: 0.87 UIU/ML (ref 0.4–4)
URN SPEC COLLECT METH UR: NORMAL
UROBILINOGEN UR STRIP-ACNC: 1 EU/DL
WBC # BLD AUTO: 4.41 K/UL (ref 3.9–12.7)

## 2019-04-24 PROCEDURE — 90791 PSYCH DIAGNOSTIC EVALUATION: CPT | Mod: GT,,, | Performed by: NURSE PRACTITIONER

## 2019-04-24 PROCEDURE — 81025 URINE PREGNANCY TEST: CPT | Performed by: EMERGENCY MEDICINE

## 2019-04-24 PROCEDURE — 81003 URINALYSIS AUTO W/O SCOPE: CPT | Mod: 59

## 2019-04-24 PROCEDURE — 85025 COMPLETE CBC W/AUTO DIFF WBC: CPT

## 2019-04-24 PROCEDURE — 80329 ANALGESICS NON-OPIOID 1 OR 2: CPT

## 2019-04-24 PROCEDURE — 84443 ASSAY THYROID STIM HORMONE: CPT

## 2019-04-24 PROCEDURE — 80320 DRUG SCREEN QUANTALCOHOLS: CPT

## 2019-04-24 PROCEDURE — 80307 DRUG TEST PRSMV CHEM ANLYZR: CPT

## 2019-04-24 PROCEDURE — 80053 COMPREHEN METABOLIC PANEL: CPT

## 2019-04-24 PROCEDURE — 99285 EMERGENCY DEPT VISIT HI MDM: CPT | Mod: 25

## 2019-04-24 PROCEDURE — 90791 PR PSYCHIATRIC DIAGNOSTIC EVALUATION: ICD-10-PCS | Mod: GT,,, | Performed by: NURSE PRACTITIONER

## 2019-04-24 NOTE — CONSULTS
"[facility]  Psychiatry  Teleconsultation Note    Please see previous notes:    Patient agreeable to consultation via telepsychiatry.    Consultation started: 4/24/2019 at 0800  The chief complaint leading to psychiatric consultation is: suicidal ideations  This consultation was requested by Beti Ferrell MD , the Emergency Department attending physician.  The location of the consulting psychiatrist is [address] [place] [address].  The patient location is [facility].  The patient arrived at the ED at: 0733  Also present with the patient at the time of the consultation: nurse    Consults  Subjective:     History of Present Illness:    Patient information was obtained from patient and past medical records.  Patient presented voluntarily to the Emergency Department ambulatory.    José Manuel Vines is a 29 y.o. Female. With past psychiatric h/o severe alcohol use d/o, caocine use d/o and MDD who presented to ED voluntarily with suicidal ideations x 3 days with plan to OD on Tylenol.  She reports symptoms of depression and unresolved grief of her sister's death.  Also reports alcohol consumption of 1-2 pints of gin per day.  Pt was last treated inpatient for similar circumstances on 3/14/19 (reviewed chart encounter).  Hx of inpt at [facility].  Also 30-day residential rehab at [facility].  Pt reports having an unstable living situation "I stay house to house with different people".  Reports that she continues to have active SI and unable to provide safety plan.      Psychiatric History:   Hospitalization: Yes, [facility].  Medication Trials: Yes, pt unable to recall.    Suicide Attempts: yes  Violence: no  Depression: yes  Devorah: no  AH's: no  Delusions: no    Past Medical History:   Past Medical History:   Diagnosis Date    Crohn's disease     Depression     Hypertension       Seizures: denies  Head trauma/l.o.c.: denies  Wish to become pregnant[if female of " "childbearing age]: denies    Allergies: see below  Review of patient's allergies indicates:  No Known Allergies    Medications in ER: Medications - No data to display    Medications at home: Remeron 30 mg po q hs    Substance Abuse History:   Alchohol: last drink was "last night".  Reports average of 1-2 pints of Gin per day.  Drug: pasts hx of cocaine abuse.     Legal History:   Past charges/incarcerations: denies  Pending charges: denies    Family Psychiatric History: none    Social History:   History of Physical/Sexual Abuse: denies  Education: 6th    Employment/Disability: disability   Financial: strain  Relationship Status/Sexual Orientation: none   Children: none   Housing Status: stay with friends  Jewish: didn't assess   History: didn't assess   Recreational Activities: NA  Access to Gun: denies     Review of Systems  Objective:   Vitals:   Vitals:    04/24/19 0736   BP: (!) 138/97   Pulse: 95   Resp: 18   Temp: 97.7 °F (36.5 °C)       Psychiatric  Level of Consciousness: alert  Orientation: oriented to person, place and time  Grooming: in hospital gown  Psychomotor Behavior: no agitation  Speech: normal in rate, rhythm and volume  Language: uses words appropriately  Mood: "depressed"  Affect: congruent, blunted  Thought Process: linear  Associations: none  Thought Content: +SI no HI/AVH  Memory: unable to assess  Attention: intact to interview  Fund of Knowledge: appears adequate  Insight: limited  Judgement: impaired    Relevant Elements of Neurological Exam: no abnormality of posture noted    Laboratory Data:   Labs Reviewed   URINALYSIS, REFLEX TO URINE CULTURE   DRUG SCREEN PANEL, URINE EMERGENCY   CBC W/ AUTO DIFFERENTIAL   COMPREHENSIVE METABOLIC PANEL   TSH   URINALYSIS, REFLEX TO URINE CULTURE   DRUG SCREEN PANEL, URINE EMERGENCY   ALCOHOL,MEDICAL (ETHANOL)   ACETAMINOPHEN LEVEL   SALICYLATE LEVEL   POCT URINE PREGNANCY     Assessment/Plan:   Diagnosis/Impression:   Substance induced mood " d/o  Suicidal ideations  Alcohol use d/o      Rec:   Dispo- Once medically cleared; Seek Involuntary Inpt psychiatric admission for stabilization of acute psychiatric symptoms.  Please re-consult for further follow up or reassessment      Psych meds  ALCOHOL/BENZO WITHDRAWAL PRECAUTIONS  · Ativan 2 mg q4 hours prn SBP> 160, DBP>105 and HR> 110. Notify primary MD.  · Vital signs q4 hours  · Thiamine, Folic acid, Vit B12 and Multivitamin supplementation     Legal-Seek/continue PEC because pt is in imminent danger of hurting self and is gravely disabled.     Time with patient: 30 minutes    More than 50% of the time was spent counseling/coordinating care    Consulting clinician was informed of the encounter and consult note.    Consultation ended: 4/24/2019 at Consult Start Time: 04/24/2019 08:00  Consult End Time: 04/24/2019 08:49          Nikos Guerra III, NP   Psychiatry  [facility]

## 2019-04-24 NOTE — ED NOTES
Pt hourly rounding complete. Pt resting in stretcher AAOx4, RR even and unlabored, NAD noted. Pt up to date on POC and has no questions, concerns, or needs at this time. Pt pain needs assessed and toileting needs addressed. Harmful objects remain out of pt room. Sitter Bethany at bedside for direct obs, will continue to monitor.

## 2019-04-24 NOTE — ED NOTES
CPT staff actively seeking psych placement. Faxed clinical packet to [facility], [facility], [facility] ([facility]), & [facility] ([facility]). Awaiting response at this time.

## 2019-04-24 NOTE — ED NOTES
"Pt presents for eval of SI. Pt states "I was feeling depressed for like three days." When asked if suicidal by Dr. Ferrell pt states "I felt like it at first, I was going to take over the counter Tylenols." "I want to detox off alcohol, I like vodka and gin, I drink everyday, but not this morning, my last drink was last night." Pt calm and cooperative, speaking in clear and complete sentences. Pt has no signs or symptoms of alcohol withdrawal at this time. Skin warm dry and intact. Pt appears well groomed. Pt AAOx4, RR even and unlabored, NAD noted. Pt reports auditory hallucinations "they are telling me to kill myself." Pt denies HI.    "

## 2019-04-24 NOTE — ED NOTES
Pt hourly rounding complete. Pt resting in stretcher AAOx4, RR even and unlabored, NAD noted. Pt up to date on POC and has no questions, concerns, or needs at this time. Pt pain needs assessed and toileting needs addressed. Pt aware meal tray ordered. Harmful objects remain out of pt room. Sitter Bethany at bedside for direct obs, will continue to monitor.

## 2019-04-24 NOTE — ED NOTES
Pt hourly rounding complete. Pt sititn up in stretcher eating meal tray, AAOx4, RR even and unlabored, NAD noted. Pt up to date on POC and has no questions, concerns, or needs at this time. Harmful objects remain out of pt room. Sitter Bethany at bedside for direct obs. Will continue to monitor.

## 2019-04-24 NOTE — ED NOTES
Pt belongings:    Jeans x5  Shirts x5  Long sleeve shirt x1  Flip flops  Bus pass  Hoop earrings  Black sweater  Black dress  Bar of soap x4  Ramen noodles x3  Slipper  Shower cap  Sanitary pads  Shorts x2  Comb  Douche  Socks  Black backpack  Gold purse  Misc papers  [place] purchase card  ID  [employer] debit card x2  Pen  Keyring  Earphones  21 cents change  Phone   Lotion  Shoes  Cellphone  Panties x6  Sports bra x2  Face wash  Toothbrush & toothpaste  Deoderant  Sweater orange  Hat     Items checked with security as witness. Items placed with security, valuables sealed with RN as witness.

## 2019-04-24 NOTE — ED NOTES
Pt undressed and placed in paper scrubs. Belongings bagged and taken out of pt room. Kiki Sims at bedside for direct obs.

## 2019-04-24 NOTE — ED NOTES
Patient accepted by [facility] at [facility] ([address], [place], [place]) for the service of NP. ANITA    Report to be called to [phone].

## 2019-04-24 NOTE — ED NOTES
Pt hourly rounding complete. Pt asleep in stretcher, RR even and unlabored, NAD noted. Harmful objects remain out pt room. Sitter Bethany at bedside for direct obs. Will continue to monitor.

## 2019-04-24 NOTE — ED NOTES
Pt hourly rounding complete. Pt resting in stretcher AAOx4, RR even and unlabored, NAD noted. Pt up to date on POC and has no questions, concerns, or needs at this time. Pt ate lunch tray provided. Pt pain needs assessed and toileting needs addressed. Harmful objects remain out of pt room. Kiki Phillip at bedside for direct obs, will continue to monitor.

## 2019-04-24 NOTE — ED NOTES
Pt escorted to ED room 6 by RN. Pt ambulated with steady gait. Harmful objects removed from room, edson Sims at bedside for direct obs.

## 2019-04-24 NOTE — ED NOTES
Pt ambulated from ED strecther to [facility] transport stretcher unassisted w/ steady gait. Pt currently calm and cooperative w/ flat affect, aaox4, speaking in clear full sentences. Respirations remains spontaneous, even and unlabored w/ no acute distress noted. Skin remains warm and dry, denies active pain or needs currently. Pt remains in facility scrubs w/ no personal belongings. ED security did secured belongings and valuables hand off to [facility] unit #  prior to transport to [facility]. Original PEC, copy of signed transfer paperwork, Patient's rights and copy of chart given to EMS upon transport.

## 2022-04-01 NOTE — ED NOTES
2 heart shaper earrings  1 bra  1 [id number] necklace  1 floral shirt  Green pants  Blue shoes  Blue cell phone  iphone    1 pair

## 2022-07-26 NOTE — ED PROVIDER NOTES
"Encounter Date: 4/24/2019    SCRIBE #1 NOTE: I, Shivani Guajardo, am scribing for, and in the presence of, Dr. Ferrell.       History     Chief Complaint   Patient presents with    Suicidal     pt states she wants to hurt herself. requesting rehab. pt states she was going to overdose but someone stopped her. unable to tell me what she was going to take.     Time seen by provider: 7:45 AM    This is a 29 y.o. female with hx of depression, HTN, and Crohn's disease who presents due to SI today. Pt states that she has been feeling depressed in the past three days, with decreased food intake and sleep disturbance. She reports feeling "hopeless." She states that she has been thinking of overdosing on OTC pain relievers, like Tylenol. Pt does report that she is "not really" feeling suicidal at this moment, but that she most recently considered suicide this morning. She states that she has auditory hallucinations with voices telling her to kill herself. She reports prior suicide attempt with overdose on OTC medications, for which she was treated with charcoal and admitted to [facility]. Pt is prescribed Remeron but reports noncompliance. Pt lives with a friend.    Pt states that she has been trying to get into a inpatient rehab facility for her alcoholism. She states that she drinks three to four bottles of gin or vodka a day, most recently throughout last night though pt has not had alcohol this morning. She reports weakness with withdrawal, stating that she is currently experiencing this sx. She states that she cannot remember the last day she went without drinking. She admits to intermittent cocaine use, but no other drug use. She does not smoke. Pt has NKDA.     Pt is also reporting chest pains, stating that she had them a few minutes ago lasting a few seconds. She is not currently in pain. She denies any fever, chills, N/V/D, abdominal pain, leg swelling, palpitations, cough, SOB, neck pain, dizziness, " "lightheadedness, HA, numbness, vision changes, urinary sx, nasal congestion, sore throat, visual hallucinations, and confusion. Pt has not had her menstrual cycle this month, but states that she doesn't believe she can get pregnant as she isn't taking her Crohn's medication and has a "blockage" in her abdomen.    The history is provided by the patient.     Review of patient's allergies indicates:  No Known Allergies  Past Medical History:   Diagnosis Date    Crohn's disease     Depression     Hypertension      Past Surgical History:   Procedure Laterality Date    arm surgery      NO PAST SURGERIES       Family History   Problem Relation Age of Onset    COPD Neg Hx      Social History     Tobacco Use    Smoking status: Current Every Day Smoker    Smokeless tobacco: Never Used   Substance Use Topics    Alcohol use: No    Drug use: No     Review of Systems   Constitutional: Positive for appetite change. Negative for chills and fever.   HENT: Negative for congestion and sore throat.    Eyes: Negative for visual disturbance.   Respiratory: Negative for cough and shortness of breath.    Cardiovascular: Positive for chest pain. Negative for palpitations and leg swelling.   Gastrointestinal: Negative for abdominal pain, diarrhea, nausea and vomiting.   Genitourinary: Negative for decreased urine volume, dysuria and vaginal discharge.   Musculoskeletal: Negative for joint swelling, neck pain and neck stiffness.   Skin: Negative for rash and wound.   Neurological: Positive for weakness. Negative for dizziness, light-headedness, numbness and headaches.   Psychiatric/Behavioral: Positive for hallucinations (auditory, no visual), sleep disturbance and suicidal ideas. Negative for confusion.       Physical Exam     Initial Vitals [04/24/19 0736]   BP Pulse Resp Temp SpO2   (!) 138/97 95 18 97.7 °F (36.5 °C) 98 %      MAP       --         Physical Exam    Nursing note and vitals reviewed.  Constitutional: She appears " well-developed and well-nourished. No distress.   HENT:   Head: Normocephalic and atraumatic.   Mouth/Throat: Oropharynx is clear and moist.   Eyes: Conjunctivae and EOM are normal. Pupils are equal, round, and reactive to light.   Neck: Normal range of motion. Neck supple.   Cardiovascular: Normal rate, regular rhythm and normal heart sounds.   No murmur heard.  Pulmonary/Chest: Breath sounds normal. No respiratory distress. She has no wheezes. She has no rhonchi. She has no rales.   Abdominal: Soft. Bowel sounds are normal. There is no tenderness.   Musculoskeletal: Normal range of motion.   Neurological: She is alert and oriented to person, place, and time. She has normal strength. No cranial nerve deficit or sensory deficit.   Skin: Skin is warm and dry. No rash noted.   Psychiatric: She exhibits a depressed mood.   Flat affect. Reports AH, voicing telling her to take pills.         ED Course   Procedures  Labs Reviewed   CBC W/ AUTO DIFFERENTIAL - Abnormal; Notable for the following components:       Result Value    Hemoglobin 11.5 (*)     Hematocrit 34.8 (*)     All other components within normal limits   ACETAMINOPHEN LEVEL - Abnormal; Notable for the following components:    Acetaminophen (Tylenol), Serum <3.0 (*)     All other components within normal limits   SALICYLATE LEVEL - Abnormal; Notable for the following components:    Salicylate Lvl <5.0 (*)     All other components within normal limits   URINALYSIS, REFLEX TO URINE CULTURE    Narrative:     Preferred Collection Type->Urine, Clean Catch   DRUG SCREEN PANEL, URINE EMERGENCY    Narrative:     Preferred Collection Type->Urine, Clean Catch   COMPREHENSIVE METABOLIC PANEL   TSH   ALCOHOL,MEDICAL (ETHANOL)   POCT URINE PREGNANCY           Medical Decision Making:   Clinical Tests:   Lab Tests: Ordered and Reviewed  ED Management:  Emergent evaluation of a 29-year-old female who presents with complaint of suicidal ideations, alcoholism and recent  cocaine use.  Vital signs are benign, afebrile.  Physical exam is benign other than psychiatric disturbance.  She admits to using cocaine and alcohol last night, none yet today.  She admits to prior suicide attempt by overdose for which she had to receive oral charcoal.  She admits to multiple psychiatric hospitalizations in the past.  Screening labs are benign.  She received a telemedicine Psychiatry consult who recommend continued PEC and placement.  She is medically cleared and stable for transfer to an appropriate psychiatric facility.            Scribe Attestation:   Scribe #1: I performed the above scribed service and the documentation accurately describes the services I performed. I attest to the accuracy of the note.    Attending Attestation:           Physician Attestation for Scribe:  Physician Attestation Statement for Scribe #1: I, Dr. Ferrell, reviewed documentation, as scribed by Shivani Guajardo in my presence, and it is both accurate and complete.                    Clinical Impression:     1. Depression, unspecified depression type    2. Substance induced mood disorder    3. Suicidal ideations    4. Alcohol abuse    5. Suicidal ideation                                 Beti Ferrell MD  04/24/19 1518     None

## 2022-09-23 NOTE — ED PROVIDER NOTES
Encounter Date: 2/15/2019       History     Chief Complaint   Patient presents with    Nausea     Pt c/o nausea, missed menstual cycle in Jan. LMP 12/12/18.     Patient is a 28 y.o. female presenting to the emergency department with complaints of nausea. The patient states that her symptoms have been persistent for the past 3 days.  She states she is nauseous but has not vomited.  No abdominal pain. She also states that she feels tired and fatigued.  She states she has not taken any medication because she is unsure of what she could take.  She denies any previous episode.  No dysuria hematuria.  No menstrual bleeding.  She states she is late for her cycle but has not taken a pregnancy test at home.This is the extent of the patient's complaints at this time.         The history is provided by the patient.     Review of patient's allergies indicates:  No Known Allergies  Past Medical History:   Diagnosis Date    Crohn's disease     Depression     Hypertension      Past Surgical History:   Procedure Laterality Date    arm surgery      NO PAST SURGERIES       Family History   Problem Relation Age of Onset    COPD Neg Hx      Social History     Tobacco Use    Smoking status: Current Every Day Smoker    Smokeless tobacco: Never Used   Substance Use Topics    Alcohol use: No    Drug use: No     Review of Systems   Constitutional: Positive for fatigue. Negative for activity change, appetite change, chills and fever.   HENT: Negative for congestion, rhinorrhea and sore throat.    Eyes: Negative for photophobia and visual disturbance.   Respiratory: Negative for cough, shortness of breath and wheezing.    Cardiovascular: Negative for chest pain.   Gastrointestinal: Positive for nausea. Negative for abdominal pain, diarrhea and vomiting.   Genitourinary: Negative for dysuria, hematuria and urgency.   Musculoskeletal: Negative for back pain, myalgias and neck pain.   Skin: Negative for color change and wound.  Potential bed avail on 20   511pm - Fyda, on call resident, paged   647pm - Fyda reports there is only a shared room available at this time as 20 made some room changes     Pt remains on the work list until appropriate placement is available        Neurological: Negative for weakness and headaches.   Psychiatric/Behavioral: Negative for agitation and confusion.       Physical Exam     Initial Vitals [02/15/19 1042]   BP Pulse Resp Temp SpO2   (!) 140/90 80 18 98.1 °F (36.7 °C) 98 %      MAP       --         Physical Exam    Nursing note and vitals reviewed.  Constitutional: She appears well-developed and well-nourished. She is not diaphoretic. She is cooperative.  Non-toxic appearance. She does not have a sickly appearance. She does not appear ill. No distress.   Well-appearing, obese,  female accompanied by female friend in the emergency room.  Speaking clearly in full sentences.  No acute distress.   HENT:   Head: Normocephalic and atraumatic.   Right Ear: External ear normal.   Left Ear: External ear normal.   Nose: Nose normal.   Mouth/Throat: Oropharynx is clear and moist.   Eyes: Conjunctivae and EOM are normal.   Neck: Normal range of motion. Neck supple.   Cardiovascular: Normal rate, regular rhythm and normal heart sounds.   Pulmonary/Chest: Breath sounds normal. No respiratory distress. She has no wheezes.   Abdominal: Soft. Bowel sounds are normal. She exhibits no distension. There is no tenderness. There is no rebound and no guarding.   Musculoskeletal: Normal range of motion.   Neurological: She is alert and oriented to person, place, and time.   Skin: Skin is warm.   Psychiatric: She has a normal mood and affect. Her behavior is normal. Judgment and thought content normal.         ED Course   Procedures  Labs Reviewed   POCT URINE PREGNANCY           Medical Decision Making:   Initial Assessment:   Urgent evaluation a 28-year-old female presenting to the emergency department with complaints of nausea.  Patient is afebrile, nontoxic appearing, hemodynamically stable. Physical exam reveals regular rate rhythm, lungs are clear to auscultation bilaterally.  Abdomen is soft and nontender.  Patient is requesting urinary pregnancy  test.  ED Management:  UPT is negative. At this point, do not feel any further testing imaging is warranted.  Patient is discharged home with symptomatic care instructions.  Counseled on symptomatic care and treatment.  Advised follow up with gyn for further evaluation management.  Discharged home in stable condition.The patient was instructed to follow up with a primary care provider in 2 days or to return to the emergency department for worsening symptoms. The treatment plan was discussed with the patient who demonstrated understanding and comfort with plan.    This note was created using [employer] Fluency Direct. There may be typographical errors secondary to dictation.                         Clinical Impression:     1. Nausea           Disposition:   Disposition: Discharged  Condition: Stable                        Juliet Huertas PA-C  02/15/19 1141